# Patient Record
Sex: FEMALE | Race: BLACK OR AFRICAN AMERICAN | NOT HISPANIC OR LATINO | Employment: FULL TIME | URBAN - METROPOLITAN AREA
[De-identification: names, ages, dates, MRNs, and addresses within clinical notes are randomized per-mention and may not be internally consistent; named-entity substitution may affect disease eponyms.]

---

## 2017-11-01 ENCOUNTER — OFFICE VISIT (OUTPATIENT)
Dept: FAMILY MEDICINE | Facility: CLINIC | Age: 32
End: 2017-11-01
Payer: COMMERCIAL

## 2017-11-01 VITALS
OXYGEN SATURATION: 99 % | TEMPERATURE: 98 F | SYSTOLIC BLOOD PRESSURE: 108 MMHG | HEART RATE: 78 BPM | DIASTOLIC BLOOD PRESSURE: 70 MMHG | BODY MASS INDEX: 31.07 KG/M2 | HEIGHT: 66 IN | WEIGHT: 193.31 LBS

## 2017-11-01 DIAGNOSIS — F39 MOOD DISORDER: ICD-10-CM

## 2017-11-01 DIAGNOSIS — G43.001 MIGRAINE WITHOUT AURA AND WITH STATUS MIGRAINOSUS, NOT INTRACTABLE: ICD-10-CM

## 2017-11-01 DIAGNOSIS — L05.91 PILONIDAL CYST: ICD-10-CM

## 2017-11-01 DIAGNOSIS — Z72.0 TOBACCO ABUSE: Primary | ICD-10-CM

## 2017-11-01 PROCEDURE — 99999 PR PBB SHADOW E&M-EST. PATIENT-LVL III: CPT | Mod: PBBFAC,,, | Performed by: FAMILY MEDICINE

## 2017-11-01 PROCEDURE — 99214 OFFICE O/P EST MOD 30 MIN: CPT | Mod: S$GLB,,, | Performed by: FAMILY MEDICINE

## 2017-11-01 RX ORDER — BUPROPION HYDROCHLORIDE 150 MG/1
150 TABLET, EXTENDED RELEASE ORAL 2 TIMES DAILY
Qty: 60 TABLET | Refills: 5 | Status: SHIPPED | OUTPATIENT
Start: 2017-11-01 | End: 2020-08-20

## 2017-11-01 RX ORDER — SULFAMETHOXAZOLE AND TRIMETHOPRIM 800; 160 MG/1; MG/1
1 TABLET ORAL 2 TIMES DAILY
Qty: 14 TABLET | Refills: 0 | Status: SHIPPED | OUTPATIENT
Start: 2017-11-01 | End: 2017-11-08

## 2017-11-01 NOTE — PROGRESS NOTES
"Subjective:       Patient ID: Caty Zapata is a 32 y.o. female.    Chief Complaint: Establish Care and Headache (daily headaches around eyes)    Patient presents with recurrent headaches.  She has headache pain behind her eyes. She figured it was her eyes so she had an eye exam and subseequenlty got glasses but the headaches have not resolved. She has them daily. She hasassociated nausea, but no vomiting. She has some blurred vision. She denies auras. She states the headaches stop with medication (ibuprofen and Excedrin).  She has had them for several months now. She sleeps about 6 hours at night. She has a stressful job and a stressful family so this can be a contributing factor. No known family history of aneurysms.     Se also mentions in closing that she has a recurrent infection in the crease of her buttocks. She states it is painful and currently draining pus.       Review of Systems   Constitutional: Positive for activity change. Negative for appetite change, chills, fatigue and fever.   Neurological: Positive for headaches. Negative for dizziness, tremors, seizures, syncope, facial asymmetry, weakness and light-headedness.       Objective:       Vitals:    11/01/17 1114   BP: 108/70   Pulse: 78   Temp: 98.2 °F (36.8 °C)   TempSrc: Oral   SpO2: 99%   Weight: 87.7 kg (193 lb 5.5 oz)   Height: 5' 6" (1.676 m)       Physical Exam   Constitutional: She is oriented to person, place, and time. She appears well-developed and well-nourished. No distress.   HENT:   Head: Normocephalic and atraumatic.   Eyes: Conjunctivae are normal.   Neck: Normal range of motion. Neck supple. Carotid bruit is not present.   Cardiovascular: Normal rate, regular rhythm and normal heart sounds.  Exam reveals no gallop and no friction rub.    No murmur heard.  Pulmonary/Chest: Effort normal and breath sounds normal. No respiratory distress. She has no wheezes. She has no rales.   Musculoskeletal: She exhibits no edema.   Neurological: She " is alert and oriented to person, place, and time.   Skin: She is not diaphoretic.            Assessment:       1. Tobacco abuse    2. Mood disorder    3. Migraine without aura and with status migrainosus, not intractable    4. Pilonidal cyst        Plan:       Caty was seen today for establish care and headache.    Diagnoses and all orders for this visit:    Tobacco abuse  -     buPROPion (WELLBUTRIN SR) 150 MG TBSR 12 hr tablet; Take 1 tablet (150 mg total) by mouth 2 (two) times daily.    Mood disorder  -     buPROPion (WELLBUTRIN SR) 150 MG TBSR 12 hr tablet; Take 1 tablet (150 mg total) by mouth 2 (two) times daily.  Will give her wellbutrin to help with he rmood andget her to quit smoking as dianne can be the cause of her headaches as well.     Migraine without aura and with status migrainosus, not intractable  -     CT Head Without Contrast; Future  Will order CT due to blurred vision.  Pilonidal cyst  -     sulfamethoxazole-trimethoprim 800-160mg (BACTRIM DS) 800-160 mg Tab; Take 1 tablet by mouth 2 (two) times daily.  -     Ambulatory referral to Colorectal Surgery

## 2017-11-03 ENCOUNTER — TELEPHONE (OUTPATIENT)
Dept: FAMILY MEDICINE | Facility: CLINIC | Age: 32
End: 2017-11-03

## 2017-11-03 NOTE — LETTER
November 3, 2017    Caty Zapata  [address]  [place] [address]             [facility]  [address] [facility]  [place] [address]  Phone: [phone]  Fax: [phone] Dear Ms. Zapata:    I have been unable to reach you by phone for your appointment Colon & Rectal .  Please call me at the clinic [phone] to book your appointment.      Sincerely,        Emma Rodriguez MA

## 2017-12-08 ENCOUNTER — OFFICE VISIT (OUTPATIENT)
Dept: FAMILY MEDICINE | Facility: CLINIC | Age: 32
End: 2017-12-08
Payer: COMMERCIAL

## 2017-12-08 VITALS
DIASTOLIC BLOOD PRESSURE: 70 MMHG | TEMPERATURE: 98 F | HEART RATE: 60 BPM | SYSTOLIC BLOOD PRESSURE: 110 MMHG | WEIGHT: 195.31 LBS | OXYGEN SATURATION: 98 % | BODY MASS INDEX: 31.39 KG/M2 | HEIGHT: 66 IN

## 2017-12-08 DIAGNOSIS — M77.8 TENDINITIS OF THUMB: ICD-10-CM

## 2017-12-08 DIAGNOSIS — M17.10 ARTHRITIS OF KNEE: Primary | ICD-10-CM

## 2017-12-08 PROCEDURE — 99213 OFFICE O/P EST LOW 20 MIN: CPT | Mod: S$GLB,,, | Performed by: FAMILY MEDICINE

## 2017-12-08 PROCEDURE — 99999 PR PBB SHADOW E&M-EST. PATIENT-LVL III: CPT | Mod: PBBFAC,,, | Performed by: FAMILY MEDICINE

## 2017-12-08 RX ORDER — NAPROXEN 500 MG/1
500 TABLET ORAL 2 TIMES DAILY WITH MEALS
Qty: 60 TABLET | Refills: 5 | Status: SHIPPED | OUTPATIENT
Start: 2017-12-08 | End: 2020-08-20 | Stop reason: ALTCHOICE
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
## 2019-04-12 ENCOUNTER — OFFICE VISIT (OUTPATIENT)
Dept: FAMILY MEDICINE | Facility: CLINIC | Age: 34
End: 2019-04-12
Payer: COMMERCIAL

## 2019-04-12 VITALS
BODY MASS INDEX: 31.57 KG/M2 | TEMPERATURE: 99 F | DIASTOLIC BLOOD PRESSURE: 72 MMHG | HEIGHT: 66 IN | OXYGEN SATURATION: 97 % | HEART RATE: 70 BPM | RESPIRATION RATE: 18 BRPM | WEIGHT: 196.44 LBS | SYSTOLIC BLOOD PRESSURE: 110 MMHG

## 2019-04-12 DIAGNOSIS — Z13.220 LIPID SCREENING: ICD-10-CM

## 2019-04-12 DIAGNOSIS — B30.9 VIRAL CONJUNCTIVITIS OF RIGHT EYE: ICD-10-CM

## 2019-04-12 DIAGNOSIS — M79.2 NEUROPATHIC PAIN OF FINGER OF LEFT HAND: ICD-10-CM

## 2019-04-12 DIAGNOSIS — J06.9 UPPER RESPIRATORY INFECTION, VIRAL: Primary | ICD-10-CM

## 2019-04-12 PROCEDURE — 96372 THER/PROPH/DIAG INJ SC/IM: CPT | Mod: S$GLB,,, | Performed by: INTERNAL MEDICINE

## 2019-04-12 PROCEDURE — 99214 PR OFFICE/OUTPT VISIT, EST, LEVL IV, 30-39 MIN: ICD-10-PCS | Mod: 25,S$GLB,, | Performed by: INTERNAL MEDICINE

## 2019-04-12 PROCEDURE — 99214 OFFICE O/P EST MOD 30 MIN: CPT | Mod: 25,S$GLB,, | Performed by: INTERNAL MEDICINE

## 2019-04-12 PROCEDURE — 96372 PR INJECTION,THERAP/PROPH/DIAG2ST, IM OR SUBCUT: ICD-10-PCS | Mod: S$GLB,,, | Performed by: INTERNAL MEDICINE

## 2019-04-12 PROCEDURE — 99999 PR PBB SHADOW E&M-EST. PATIENT-LVL III: ICD-10-PCS | Mod: PBBFAC,,, | Performed by: INTERNAL MEDICINE

## 2019-04-12 PROCEDURE — 99999 PR PBB SHADOW E&M-EST. PATIENT-LVL III: CPT | Mod: PBBFAC,,, | Performed by: INTERNAL MEDICINE

## 2019-04-12 RX ORDER — TRIAMCINOLONE ACETONIDE 40 MG/ML
40 INJECTION, SUSPENSION INTRA-ARTICULAR; INTRAMUSCULAR ONCE
Status: COMPLETED | OUTPATIENT
Start: 2019-04-12 | End: 2019-04-12

## 2019-04-12 RX ORDER — PROMETHAZINE HYDROCHLORIDE AND DEXTROMETHORPHAN HYDROBROMIDE 6.25; 15 MG/5ML; MG/5ML
5 SYRUP ORAL EVERY 12 HOURS PRN
Qty: 180 ML | Refills: 0 | Status: SHIPPED | OUTPATIENT
Start: 2019-04-12 | End: 2019-04-22

## 2019-04-12 RX ORDER — AZELASTINE HYDROCHLORIDE 0.5 MG/ML
1 SOLUTION/ DROPS OPHTHALMIC 2 TIMES DAILY
Qty: 4 ML | Refills: 0 | Status: SHIPPED | OUTPATIENT
Start: 2019-04-12 | End: 2020-08-20

## 2019-04-12 RX ORDER — DESLORATADINE 5 MG/1
5 TABLET ORAL DAILY
Qty: 30 TABLET | Refills: 0 | Status: SHIPPED | OUTPATIENT
Start: 2019-04-12 | End: 2020-08-20 | Stop reason: ALTCHOICE

## 2019-04-12 RX ORDER — METHYLPREDNISOLONE 4 MG/1
TABLET ORAL
Qty: 1 PACKAGE | Refills: 0 | Status: SHIPPED | OUTPATIENT
Start: 2019-04-13 | End: 2020-08-20 | Stop reason: ALTCHOICE

## 2019-04-12 RX ADMIN — TRIAMCINOLONE ACETONIDE 40 MG: 40 INJECTION, SUSPENSION INTRA-ARTICULAR; INTRAMUSCULAR at 04:04

## 2019-04-12 NOTE — PROGRESS NOTES
SUBJECTIVE     Chief Complaint   Patient presents with    Sinus Problem    index(left) finger pain       HPI  Caty Zapata is a 34 y.o. female with multiple medical diagnoses as listed in the medical history and problem list that presents for evaluation of URI x 1 week. Pt reports runny nose, R eye pain with swelling, and headache. +subjective fever, chills, and night sweats. Pt has been taking Benadryl and Tylenol Cold and Sinus with some improvement of symptoms. Denies any recent travel. +sick contacts(niece, mother, and brother with URI).    L index finger- x 6-7 months. Pt reports it feels like needles sticking her at the tip of the finger at a 10/10 and intermittent in nature. Pain is worse while at work with typing/writing. Pt has not been taking any meds for her symptoms.    PAST MEDICAL HISTORY:  Past Medical History:   Diagnosis Date    Abnormal Pap smear     Cervical cancer     Vaginal delivery     x1       PAST SURGICAL HISTORY:  Past Surgical History:   Procedure Laterality Date    KNEE SURGERY  2015    patellar surgery. dr. vazquez    [place]    ORIF, FRACTURE, PATELLA Left 12/4/2013    Performed by Abhishek Vazquez MD at [facility] OR       SOCIAL HISTORY:  Social History     Socioeconomic History    Marital status: Single     Spouse name: Not on file    Number of children: Not on file    Years of education: Not on file    Highest education level: Not on file   Occupational History     Comment: works for [employer].    Social Needs    Financial resource strain: Not on file    Food insecurity:     Worry: Not on file     Inability: Not on file    Transportation needs:     Medical: Not on file     Non-medical: Not on file   Tobacco Use    Smoking status: Current Every Day Smoker     Packs/day: 0.50     Years: 10.00     Pack years: 5.00   Substance and Sexual Activity    Alcohol use: Yes     Comment: occassional    Drug use: No    Sexual activity: Yes      Partners: Male   Lifestyle    Physical activity:     Days per week: Not on file     Minutes per session: Not on file    Stress: Not on file   Relationships    Social connections:     Talks on phone: Not on file     Gets together: Not on file     Attends Restoration service: Not on file     Active member of club or organization: Not on file     Attends meetings of clubs or organizations: Not on file     Relationship status: Not on file   Other Topics Concern    Not on file   Social History Narrative    Not on file       FAMILY HISTORY:  Family History   Problem Relation Age of Onset    Diabetes Mother     Hypertension Mother     Bell's palsy Mother     Stroke Paternal Grandfather     Cervical cancer Sister     Breast cancer Maternal Grandmother     Breast cancer Maternal Aunt     Colon cancer Neg Hx     Ovarian cancer Neg Hx        ALLERGIES AND MEDICATIONS: updated and reviewed.  Review of patient's allergies indicates:  No Known Allergies  Current Outpatient Medications   Medication Sig Dispense Refill    azelastine (OPTIVAR) 0.05 % ophthalmic solution Place 1 drop into the right eye 2 (two) times daily. 4 mL 0    buPROPion (WELLBUTRIN SR) 150 MG TBSR 12 hr tablet Take 1 tablet (150 mg total) by mouth 2 (two) times daily. 60 tablet 5    desloratadine (CLARINEX) 5 mg tablet Take 1 tablet (5 mg total) by mouth once daily. 30 tablet 0    [START ON 4/13/2019] methylPREDNISolone (MEDROL DOSEPACK) 4 mg tablet use as directed 1 Package 0    naproxen (NAPROSYN) 500 MG tablet Take 1 tablet (500 mg total) by mouth 2 (two) times daily with meals. 60 tablet 5    promethazine-dextromethorphan (PROMETHAZINE-DM) 6.25-15 mg/5 mL Syrp Take 5 mLs by mouth every 12 (twelve) hours as needed. 180 mL 0     Current Facility-Administered Medications   Medication Dose Route Frequency Provider Last Rate Last Dose    triamcinolone acetonide injection 40 mg  40 mg Intramuscular Once Lissette Cordero MD      "      ROS  Review of Systems   Constitutional: Positive for chills and fever.   HENT: Negative for hearing loss and sore throat.    Eyes: Positive for pain (right). Negative for visual disturbance.   Respiratory: Positive for chest tightness. Negative for cough and shortness of breath.    Cardiovascular: Negative for chest pain, palpitations and leg swelling.   Gastrointestinal: Negative for abdominal pain, constipation, diarrhea, nausea and vomiting.   Genitourinary: Negative for dysuria, frequency and urgency.   Musculoskeletal: Negative for arthralgias, joint swelling and myalgias.   Skin: Negative for rash and wound.   Neurological: Positive for numbness (L index finger) and headaches.   Psychiatric/Behavioral: Negative for agitation and confusion. The patient is not nervous/anxious.          OBJECTIVE     Physical Exam  Vitals:    04/12/19 1600   BP: 110/72   Pulse: 70   Resp: 18   Temp: 98.5 °F (36.9 °C)    Body mass index is 31.7 kg/m².  Weight: 89.1 kg (196 lb 6.9 oz)   Height: 5' 6" (167.6 cm)     Physical Exam   Constitutional: She is oriented to person, place, and time. She appears well-developed and well-nourished. No distress.   HENT:   Head: Normocephalic and atraumatic.   Right Ear: Hearing, tympanic membrane and external ear normal.   Left Ear: Hearing, tympanic membrane and external ear normal.   Nose: No rhinorrhea. Right sinus exhibits no maxillary sinus tenderness and no frontal sinus tenderness. Left sinus exhibits no maxillary sinus tenderness and no frontal sinus tenderness.   Mouth/Throat: No uvula swelling. Posterior oropharyngeal erythema present. No posterior oropharyngeal edema.   Eyes: Conjunctivae and EOM are normal. Right eye exhibits no discharge. Left eye exhibits no discharge. No scleral icterus.   Neck: Normal range of motion. Neck supple. No JVD present. No tracheal deviation present.   Cardiovascular: Normal rate, regular rhythm and intact distal pulses. Exam reveals no gallop " and no friction rub.   No murmur heard.  Pulmonary/Chest: Effort normal and breath sounds normal. No respiratory distress. She has no wheezes.   Abdominal: Soft. Bowel sounds are normal. She exhibits no distension and no mass. There is no tenderness. There is no rebound and no guarding.   Musculoskeletal: Normal range of motion. She exhibits no edema, tenderness or deformity.   Neurological: She is alert and oriented to person, place, and time. She exhibits normal muscle tone. Coordination normal.   Skin: Skin is warm and dry. No rash noted. No erythema.   Psychiatric: She has a normal mood and affect. Her behavior is normal. Judgment and thought content normal.         Health Maintenance       Date Due Completion Date    Lipid Panel 1985 ---    Pneumococcal Vaccine (Medium Risk) (1 of 1 - PPSV23) 03/07/2004 ---    Pap Smear 12/12/2019 12/12/2018    TETANUS VACCINE 12/01/2023 12/1/2013            ASSESSMENT     34 y.o. female with     1. Upper respiratory infection, viral    2. Viral conjunctivitis of right eye    3. Neuropathic pain of finger of left hand    4. Lipid screening        PLAN:     1. Upper respiratory infection, viral  - Continue symptomatic treatment with rest, increase fluid intake, tylenol or ibuprofen PRN fever(temp >/= 100.4) or body aches. Okay to take OTC antihistamines, i.e. Bendaryl, Claritin, Allegra, etc. as needed.  - Okay to gargle with warm, salt water or use throat lozenges as needed  - triamcinolone acetonide injection 40 mg  - desloratadine (CLARINEX) 5 mg tablet; Take 1 tablet (5 mg total) by mouth once daily.  Dispense: 30 tablet; Refill: 0  - methylPREDNISolone (MEDROL DOSEPACK) 4 mg tablet; use as directed  Dispense: 1 Package; Refill: 0  - promethazine-dextromethorphan (PROMETHAZINE-DM) 6.25-15 mg/5 mL Syrp; Take 5 mLs by mouth every 12 (twelve) hours as needed.  Dispense: 180 mL; Refill: 0    2. Viral conjunctivitis of right eye  - azelastine (OPTIVAR) 0.05 % ophthalmic  solution; Place 1 drop into the right eye 2 (two) times daily.  Dispense: 4 mL; Refill: 0    3. Neuropathic pain of finger of left hand  - Unknown cause, but start workup as below  - Take NSAIDs prn   - Comprehensive metabolic panel; Future  - CBC auto differential; Future  - TSH; Future  - Hemoglobin A1c; Future  - Sedimentation rate; Future  - C-reactive protein; Future  - Hepatitis panel, acute; Future  - RPR; Future  - Vitamin B12; Future  - Folate; Future    4. Lipid screening  - Lipid panel; Future        RTC in 1-2 weeks as needed for any acute worsening of current condition or failure to improve       Lissette Cordero MD  04/12/2019 4:04 PM        No follow-ups on file.

## 2019-04-12 NOTE — LETTER
April 12, 2019      [facility]  [address]  [address]  [place] [address]  Phone: [phone]  Fax: [phone]       Patient: Caty Zapata   YOB: 1985  Date of Visit: 04/12/2019    To Whom It May Concern:    Prema Zapata  was at [facility] on 04/12/2019. She may return to work/school on 4/15/19 with no restrictions. If you have any questions or concerns, or if I can be of further assistance, please do not hesitate to contact me.    Sincerely,    Lissette Cordero MD

## 2019-04-15 ENCOUNTER — TELEPHONE (OUTPATIENT)
Dept: FAMILY MEDICINE | Facility: CLINIC | Age: 34
End: 2019-04-15

## 2019-04-15 NOTE — TELEPHONE ENCOUNTER
----- Message from Tani Lane sent at 4/12/2019  4:49 PM CDT -----  Contact: [facility]  Name of Who is Calling:Pharmacist        What is the request in detail: Pt's desloratadine (CLARINEX) 5 mg tablet is too expensive; requesting alternate. Please advise.      What Number to Call Back if not in [facility]:

## 2019-04-15 NOTE — TELEPHONE ENCOUNTER
Okay to take any OTC antihistamines, i.e. Bendaryl, Claritin, Allegra, Xyzal, etc. as needed. Any of these are good alternatives.

## 2020-08-20 ENCOUNTER — OFFICE VISIT (OUTPATIENT)
Dept: FAMILY MEDICINE | Facility: CLINIC | Age: 35
End: 2020-08-20
Payer: COMMERCIAL

## 2020-08-20 VITALS
TEMPERATURE: 98 F | WEIGHT: 205 LBS | OXYGEN SATURATION: 99 % | BODY MASS INDEX: 32.95 KG/M2 | HEART RATE: 81 BPM | HEIGHT: 66 IN | SYSTOLIC BLOOD PRESSURE: 100 MMHG | DIASTOLIC BLOOD PRESSURE: 80 MMHG

## 2020-08-20 DIAGNOSIS — L72.0 EPIDERMAL INCLUSION CYST: Primary | ICD-10-CM

## 2020-08-20 PROCEDURE — 99999 PR PBB SHADOW E&M-EST. PATIENT-LVL III: ICD-10-PCS | Mod: PBBFAC,,, | Performed by: FAMILY MEDICINE

## 2020-08-20 PROCEDURE — 99999 PR PBB SHADOW E&M-EST. PATIENT-LVL III: CPT | Mod: PBBFAC,,, | Performed by: FAMILY MEDICINE

## 2020-08-20 PROCEDURE — 99214 PR OFFICE/OUTPT VISIT, EST, LEVL IV, 30-39 MIN: ICD-10-PCS | Mod: 25,S$GLB,, | Performed by: FAMILY MEDICINE

## 2020-08-20 PROCEDURE — 99214 OFFICE O/P EST MOD 30 MIN: CPT | Mod: 25,S$GLB,, | Performed by: FAMILY MEDICINE

## 2020-08-20 RX ORDER — CLINDAMYCIN HYDROCHLORIDE 300 MG/1
CAPSULE ORAL
COMMUNITY
Start: 2020-08-19 | End: 2021-02-25

## 2020-08-20 NOTE — PROGRESS NOTES
Chief Complaint   Patient presents with    Recurrent Skin Infections       SUBJECTIVE:  Caty Zapata is a 35 y.o. female here for new problem of abscees/cyst on buttocks, new to me, treated outside with clindamycin and improved cellulitis but a lot of pain and needs to drain she states, no fever or chills noted.  Currently has co-morbidities including per problem list.      Past Medical History:   Diagnosis Date    Abnormal Pap smear     Cervical cancer     Vaginal delivery     x1     Past Surgical History:   Procedure Laterality Date    KNEE SURGERY  2015    patellar surgery. dr. george HARTLEY   2002    of cervix     Social History     Socioeconomic History    Marital status: Single     Spouse name: Not on file    Number of children: Not on file    Years of education: Not on file    Highest education level: Not on file   Occupational History     Comment: works for [employer].    Social Needs    Financial resource strain: Not on file    Food insecurity     Worry: Not on file     Inability: Not on file    Transportation needs     Medical: Not on file     Non-medical: Not on file   Tobacco Use    Smoking status: Current Every Day Smoker     Packs/day: 0.50     Years: 10.00     Pack years: 5.00   Substance and Sexual Activity    Alcohol use: Yes     Comment: occassional    Drug use: No    Sexual activity: Yes     Partners: Male   Lifestyle    Physical activity     Days per week: Not on file     Minutes per session: Not on file    Stress: Not at all   Relationships    Social connections     Talks on phone: Not on file     Gets together: Not on file     Attends Druze service: Not on file     Active member of club or organization: Not on file     Attends meetings of clubs or organizations: Not on file     Relationship status: Not on file   Other Topics Concern    Not on file   Social History Narrative    Not on file     Family History   Problem Relation Age of Onset     "Diabetes Mother     Hypertension Mother     Bell's palsy Mother     Stroke Paternal Grandfather     Cervical cancer Sister     Breast cancer Maternal Grandmother     Breast cancer Maternal Aunt     Colon cancer Neg Hx     Ovarian cancer Neg Hx      Current Outpatient Medications on File Prior to Visit   Medication Sig Dispense Refill    clindamycin (CLEOCIN) 300 MG capsule TAKE ONE CAPSULE BY MOUTH EVERY 8 HOURS FOR 10 DAYS      azelastine (OPTIVAR) 0.05 % ophthalmic solution Place 1 drop into the right eye 2 (two) times daily. (Patient not taking: Reported on 8/20/2020) 4 mL 0    buPROPion (WELLBUTRIN SR) 150 MG TBSR 12 hr tablet Take 1 tablet (150 mg total) by mouth 2 (two) times daily. (Patient not taking: Reported on 8/20/2020) 60 tablet 5    desloratadine (CLARINEX) 5 mg tablet Take 1 tablet (5 mg total) by mouth once daily. (Patient not taking: Reported on 8/20/2020) 30 tablet 0    methylPREDNISolone (MEDROL DOSEPACK) 4 mg tablet use as directed (Patient not taking: Reported on 8/20/2020) 1 Package 0    naproxen (NAPROSYN) 500 MG tablet Take 1 tablet (500 mg total) by mouth 2 (two) times daily with meals. (Patient not taking: Reported on 8/20/2020) 60 tablet 5     No current facility-administered medications on file prior to visit.      Review of patient's allergies indicates:  No Known Allergies      Review of Systems   Constitutional: Negative.    HENT: Negative.    Eyes: Negative.    Respiratory: Negative.    Cardiovascular: Negative.    Gastrointestinal: Negative.    Genitourinary: Negative.    Musculoskeletal: Negative.    Skin: Negative.         Skin infection   Neurological: Negative.    Endo/Heme/Allergies: Negative.    Psychiatric/Behavioral: Negative.        OBJECTIVE:  /80   Pulse 81   Temp 98.1 °F (36.7 °C) (Oral)   Ht 5' 6" (1.676 m)   Wt 93 kg (205 lb 0.4 oz)   LMP 07/31/2020   SpO2 99%   BMI 33.09 kg/m²     Wt Readings from Last 3 Encounters:   08/20/20 93 kg (205 lb " 0.4 oz)   04/12/19 89.1 kg (196 lb 6.9 oz)   04/12/19 88.9 kg (196 lb)     BP Readings from Last 3 Encounters:   08/20/20 100/80   04/12/19 110/72   04/12/19 124/82       She appears well, in no apparent distress.  Alert and oriented times three, pleasant and cooperative. Vital signs are as documented in vital signs section.  In pain  Buttocks with large cyst/abscess in the upper gluteal cleft  Severe pain and no cellulitis    Review of old Records:  Reviewed [facility]    Review of old labs:      Review of old imaging:      ASSESSMENT:  Problem List Items Addressed This Visit     None      Visit Diagnoses     Epidermal inclusion cyst    -  Primary    Relevant Orders    Ambulatory referral/consult to General Surgery          ICD-10-CM ICD-9-CM   1. Epidermal inclusion cyst  L72.0 706.2       Infected  Continue clindamycin  May need full excision to fully resolve  Get to the surgery team  PLAN:  Problem List Items Addressed This Visit     None      Visit Diagnoses     Epidermal inclusion cyst    -  Primary    Relevant Orders    Ambulatory referral/consult to General Surgery          Medication List with Changes/Refills   Current Medications    AZELASTINE (OPTIVAR) 0.05 % OPHTHALMIC SOLUTION    Place 1 drop into the right eye 2 (two) times daily.    BUPROPION (WELLBUTRIN SR) 150 MG TBSR 12 HR TABLET    Take 1 tablet (150 mg total) by mouth 2 (two) times daily.    CLINDAMYCIN (CLEOCIN) 300 MG CAPSULE    TAKE ONE CAPSULE BY MOUTH EVERY 8 HOURS FOR 10 DAYS    DESLORATADINE (CLARINEX) 5 MG TABLET    Take 1 tablet (5 mg total) by mouth once daily.    METHYLPREDNISOLONE (MEDROL DOSEPACK) 4 MG TABLET    use as directed    NAPROXEN (NAPROSYN) 500 MG TABLET    Take 1 tablet (500 mg total) by mouth 2 (two) times daily with meals.         No follow-ups on file.

## 2020-08-21 NOTE — PROCEDURES
"Incision & Drainage    Date/Time: 8/20/2020 11:15 AM  Performed by: Karsten Harris MD  Authorized by: Karsten Harris MD     Time out: Immediately prior to procedure a "time out" was called to verify the correct patient, procedure, equipment, support staff and site/side marked as required.    Consent Done?:  Yes (Written)    Type:  Abscess  Body area:  Anogenital  Location details:  Gluteal cleft  Scalpel size:  11  Incision type:  Single straight  Complexity:  Simple  Drainage:  Pus  Drainage amount:  Copious  Wound treatment:  Wound left open  Patient tolerance:  Patient tolerated the procedure well with no immediate complications    15 cc blood loss      "

## 2020-11-25 ENCOUNTER — OFFICE VISIT (OUTPATIENT)
Dept: FAMILY MEDICINE | Facility: CLINIC | Age: 35
End: 2020-11-25
Payer: COMMERCIAL

## 2020-11-25 VITALS
BODY MASS INDEX: 32.71 KG/M2 | WEIGHT: 203.5 LBS | TEMPERATURE: 98 F | OXYGEN SATURATION: 99 % | SYSTOLIC BLOOD PRESSURE: 110 MMHG | HEIGHT: 66 IN | RESPIRATION RATE: 18 BRPM | HEART RATE: 74 BPM | DIASTOLIC BLOOD PRESSURE: 74 MMHG

## 2020-11-25 DIAGNOSIS — Z72.0 TOBACCO USE: ICD-10-CM

## 2020-11-25 DIAGNOSIS — F48.9 TENSION: ICD-10-CM

## 2020-11-25 DIAGNOSIS — K59.00 CONSTIPATION, UNSPECIFIED CONSTIPATION TYPE: ICD-10-CM

## 2020-11-25 DIAGNOSIS — R10.13 EPIGASTRIC ABDOMINAL PAIN: Primary | ICD-10-CM

## 2020-11-25 PROCEDURE — 99214 PR OFFICE/OUTPT VISIT, EST, LEVL IV, 30-39 MIN: ICD-10-PCS | Mod: S$GLB,,, | Performed by: PHYSICIAN ASSISTANT

## 2020-11-25 PROCEDURE — 99999 PR PBB SHADOW E&M-EST. PATIENT-LVL IV: CPT | Mod: PBBFAC,,, | Performed by: PHYSICIAN ASSISTANT

## 2020-11-25 PROCEDURE — 99214 OFFICE O/P EST MOD 30 MIN: CPT | Mod: S$GLB,,, | Performed by: PHYSICIAN ASSISTANT

## 2020-11-25 PROCEDURE — 99999 PR PBB SHADOW E&M-EST. PATIENT-LVL IV: ICD-10-PCS | Mod: PBBFAC,,, | Performed by: PHYSICIAN ASSISTANT

## 2020-11-25 RX ORDER — IBUPROFEN 200 MG
1 TABLET ORAL DAILY
Qty: 42 PATCH | Refills: 0 | Status: SHIPPED | OUTPATIENT
Start: 2020-11-25 | End: 2021-01-06

## 2020-11-25 RX ORDER — TIZANIDINE 4 MG/1
4 TABLET ORAL NIGHTLY PRN
Qty: 30 TABLET | Refills: 0 | Status: SHIPPED | OUTPATIENT
Start: 2020-11-25 | End: 2020-12-05

## 2020-11-25 NOTE — PROGRESS NOTES
Subjective:       Patient ID: Caty Zapata is a 35 y.o. female with multiple medical diagnoses as listed in the medical history and problem list that presents for Abdominal Pain (x 3 days) and Headache  .    Chief Complaint: Abdominal Pain (x 3 days) and Headache      Abdominal Pain  This is a new problem. The current episode started in the past 7 days (3 days ). The problem occurs intermittently. The problem has been gradually improving. The pain is located in the epigastric region. Quality: sharp  The abdominal pain does not radiate. Associated symptoms include constipation, diarrhea (monday twice ), flatus, headaches and nausea. Pertinent negatives include no anorexia, belching or vomiting. Associated symptoms comments: Last BM Monday diarrhea that day  She states she only usually has a BM 3-4 x a month   Pt states sometimes long pieces and sometimes alex  No straining . Relieved by: lying. She has tried nothing for the symptoms.   Headache   This is a new problem. The current episode started more than 1 month ago (this year; has glasses but does not wear them low prescription but when she wears it it hleps a little ). The problem occurs constantly. The problem has been waxing and waning. The pain is located in the bilateral and retro-orbital region. The pain radiates to the left arm, left shoulder and left neck (in general more stressed ). The pain is at a severity of 10/10 (with med down to a zero ). Associated symptoms include abdominal pain, blurred vision and nausea. Pertinent negatives include no anorexia, insomnia (with melatonin ), phonophobia, photophobia or vomiting. Treatments tried: pain-aid--takes every 4 hours over the past few weeks  The treatment provided significant relief.        Review of Systems   Eyes: Positive for blurred vision. Negative for photophobia.   Gastrointestinal: Positive for abdominal pain, constipation, diarrhea (monday twice ), flatus and nausea. Negative for anorexia and  vomiting.   Neurological: Positive for headaches.   Psychiatric/Behavioral: The patient does not have insomnia (with melatonin ).          PAST MEDICAL HISTORY:  Past Medical History:   Diagnosis Date    Abnormal Pap smear     Cervical cancer     Vaginal delivery     x1       SOCIAL HISTORY:  Social History     Socioeconomic History    Marital status: Single     Spouse name: Not on file    Number of children: Not on file    Years of education: Not on file    Highest education level: Not on file   Occupational History     Comment: works for [employer].    Social Needs    Financial resource strain: Not on file    Food insecurity     Worry: Not on file     Inability: Not on file    Transportation needs     Medical: Not on file     Non-medical: Not on file   Tobacco Use    Smoking status: Current Every Day Smoker     Packs/day: 0.50     Years: 10.00     Pack years: 5.00   Substance and Sexual Activity    Alcohol use: Yes     Comment: occassional    Drug use: No    Sexual activity: Yes     Partners: Male   Lifestyle    Physical activity     Days per week: Not on file     Minutes per session: Not on file    Stress: Not at all   Relationships    Social connections     Talks on phone: Not on file     Gets together: Not on file     Attends Yarsanism service: Not on file     Active member of club or organization: Not on file     Attends meetings of clubs or organizations: Not on file     Relationship status: Not on file   Other Topics Concern    Not on file   Social History Narrative    Not on file       ALLERGIES AND MEDICATIONS: updated and reviewed.  Review of patient's allergies indicates:  No Known Allergies  Current Outpatient Medications   Medication Sig Dispense Refill    clindamycin (CLEOCIN) 300 MG capsule TAKE ONE CAPSULE BY MOUTH EVERY 8 HOURS FOR 10 DAYS      nicotine (NICODERM CQ) 21 mg/24 hr Place 1 patch onto the skin once daily. 42 patch 0    tiZANidine (ZANAFLEX) 4 MG  "tablet Take 1 tablet (4 mg total) by mouth nightly as needed. 30 tablet 0     No current facility-administered medications for this visit.          Objective:   /74 (BP Location: Left arm, Patient Position: Sitting, BP Method: Large (Manual))   Pulse 74   Temp 98.2 °F (36.8 °C) (Oral)   Resp 18   Ht 5' 6" (1.676 m)   Wt 92.3 kg (203 lb 7.8 oz)   SpO2 99%   BMI 32.84 kg/m²      Physical Exam  HENT:      Head: Normocephalic and atraumatic.   Abdominal:      General: Bowel sounds are normal.      Tenderness: There is abdominal tenderness in the epigastric area.      Hernia: No hernia is present.   Neurological:      Mental Status: She is alert and oriented to person, place, and time.             Assessment:       1. Epigastric abdominal pain    2. Tension    3. Constipation, unspecified constipation type    4. Tobacco use        Plan:       Epigastric abdominal pain  -     X-Ray Abdomen Flat And Erect; Future; Expected date: 11/25/2020  -     Comprehensive Metabolic Panel; Future; Expected date: 11/25/2020  -     Lipase; Future; Expected date: 11/25/2020  -     Amylase; Future; Expected date: 11/25/2020  Fiber pills  Water 3-4 bottles   miralax powder      Tension  -     tiZANidine (ZANAFLEX) 4 MG tablet; Take 1 tablet (4 mg total) by mouth nightly as needed.  Dispense: 30 tablet; Refill: 0  Moist heat  Exercise to help BMs and stress  Wear her glasses as well since it does somewhat help. Follow up since it has been 2 years since her eye exam.     Constipation, unspecified constipation type  -     X-Ray Abdomen Flat And Erect; Future; Expected date: 11/25/2020  -will contact with results     Tobacco use  -     nicotine (NICODERM CQ) 21 mg/24 hr; Place 1 patch onto the skin once daily.  Dispense: 42 patch; Refill: 0  Follow up in 5 weeks for this.           No follow-ups on file.  "

## 2021-02-25 ENCOUNTER — OFFICE VISIT (OUTPATIENT)
Dept: FAMILY MEDICINE | Facility: CLINIC | Age: 36
End: 2021-02-25
Payer: COMMERCIAL

## 2021-02-25 VITALS
RESPIRATION RATE: 16 BRPM | HEIGHT: 66 IN | BODY MASS INDEX: 32.59 KG/M2 | DIASTOLIC BLOOD PRESSURE: 80 MMHG | TEMPERATURE: 98 F | HEART RATE: 68 BPM | SYSTOLIC BLOOD PRESSURE: 110 MMHG | OXYGEN SATURATION: 98 % | WEIGHT: 202.81 LBS

## 2021-02-25 DIAGNOSIS — G44.40 MEDICATION OVERUSE HEADACHE: Primary | ICD-10-CM

## 2021-02-25 DIAGNOSIS — R29.3 BAD POSTURE: ICD-10-CM

## 2021-02-25 DIAGNOSIS — M25.511 ACUTE PAIN OF RIGHT SHOULDER: ICD-10-CM

## 2021-02-25 PROCEDURE — 99214 OFFICE O/P EST MOD 30 MIN: CPT | Mod: S$GLB,,, | Performed by: PHYSICIAN ASSISTANT

## 2021-02-25 PROCEDURE — 99999 PR PBB SHADOW E&M-EST. PATIENT-LVL III: CPT | Mod: PBBFAC,,, | Performed by: PHYSICIAN ASSISTANT

## 2021-02-25 PROCEDURE — 99214 PR OFFICE/OUTPT VISIT, EST, LEVL IV, 30-39 MIN: ICD-10-PCS | Mod: S$GLB,,, | Performed by: PHYSICIAN ASSISTANT

## 2021-02-25 PROCEDURE — 99999 PR PBB SHADOW E&M-EST. PATIENT-LVL III: ICD-10-PCS | Mod: PBBFAC,,, | Performed by: PHYSICIAN ASSISTANT

## 2021-02-25 RX ORDER — BACLOFEN 10 MG/1
10 TABLET ORAL NIGHTLY PRN
Qty: 30 TABLET | Refills: 0 | Status: SHIPPED | OUTPATIENT
Start: 2021-02-25 | End: 2022-03-09

## 2021-02-25 RX ORDER — AMITRIPTYLINE HYDROCHLORIDE 10 MG/1
10 TABLET, FILM COATED ORAL NIGHTLY PRN
Qty: 30 TABLET | Refills: 0 | Status: SHIPPED | OUTPATIENT
Start: 2021-02-25 | End: 2022-03-09

## 2021-02-25 RX ORDER — SUMATRIPTAN SUCCINATE 25 MG/1
TABLET ORAL
Qty: 30 TABLET | Refills: 0 | Status: SHIPPED | OUTPATIENT
Start: 2021-02-25 | End: 2022-03-09

## 2021-06-24 ENCOUNTER — OFFICE VISIT (OUTPATIENT)
Dept: FAMILY MEDICINE | Facility: CLINIC | Age: 36
End: 2021-06-24
Payer: COMMERCIAL

## 2021-06-24 VITALS
BODY MASS INDEX: 33.31 KG/M2 | DIASTOLIC BLOOD PRESSURE: 74 MMHG | OXYGEN SATURATION: 99 % | WEIGHT: 207.25 LBS | SYSTOLIC BLOOD PRESSURE: 110 MMHG | HEIGHT: 66 IN | TEMPERATURE: 98 F | RESPIRATION RATE: 18 BRPM | HEART RATE: 68 BPM

## 2021-06-24 DIAGNOSIS — M25.512 PAIN, JOINT, SHOULDER, LEFT: ICD-10-CM

## 2021-06-24 DIAGNOSIS — M54.2 ACUTE NECK PAIN: Primary | ICD-10-CM

## 2021-06-24 PROCEDURE — 99214 OFFICE O/P EST MOD 30 MIN: CPT | Mod: 25,S$GLB,, | Performed by: NURSE PRACTITIONER

## 2021-06-24 PROCEDURE — 99214 PR OFFICE/OUTPT VISIT, EST, LEVL IV, 30-39 MIN: ICD-10-PCS | Mod: 25,S$GLB,, | Performed by: NURSE PRACTITIONER

## 2021-06-24 PROCEDURE — 99999 PR PBB SHADOW E&M-EST. PATIENT-LVL IV: ICD-10-PCS | Mod: PBBFAC,,, | Performed by: NURSE PRACTITIONER

## 2021-06-24 PROCEDURE — 99999 PR PBB SHADOW E&M-EST. PATIENT-LVL IV: CPT | Mod: PBBFAC,,, | Performed by: NURSE PRACTITIONER

## 2021-06-24 PROCEDURE — 96372 THER/PROPH/DIAG INJ SC/IM: CPT | Mod: S$GLB,,, | Performed by: NURSE PRACTITIONER

## 2021-06-24 PROCEDURE — 96372 PR INJECTION,THERAP/PROPH/DIAG2ST, IM OR SUBCUT: ICD-10-PCS | Mod: S$GLB,,, | Performed by: NURSE PRACTITIONER

## 2021-06-24 RX ORDER — KETOROLAC TROMETHAMINE 30 MG/ML
30 INJECTION, SOLUTION INTRAMUSCULAR; INTRAVENOUS
Status: COMPLETED | OUTPATIENT
Start: 2021-06-24 | End: 2021-06-24

## 2021-06-24 RX ADMIN — KETOROLAC TROMETHAMINE 30 MG: 30 INJECTION, SOLUTION INTRAMUSCULAR; INTRAVENOUS at 10:06

## 2021-06-30 DIAGNOSIS — M54.2 NECK PAIN: Primary | ICD-10-CM

## 2021-07-20 ENCOUNTER — TELEPHONE (OUTPATIENT)
Dept: PAIN MEDICINE | Facility: CLINIC | Age: 36
End: 2021-07-20

## 2022-03-09 ENCOUNTER — OFFICE VISIT (OUTPATIENT)
Dept: FAMILY MEDICINE | Facility: CLINIC | Age: 37
End: 2022-03-09
Payer: COMMERCIAL

## 2022-03-09 VITALS
BODY MASS INDEX: 33.41 KG/M2 | WEIGHT: 207.88 LBS | HEART RATE: 76 BPM | TEMPERATURE: 99 F | OXYGEN SATURATION: 99 % | DIASTOLIC BLOOD PRESSURE: 70 MMHG | HEIGHT: 66 IN | RESPIRATION RATE: 16 BRPM | SYSTOLIC BLOOD PRESSURE: 114 MMHG

## 2022-03-09 DIAGNOSIS — R06.09 DYSPNEA ON EXERTION: Primary | ICD-10-CM

## 2022-03-09 DIAGNOSIS — E66.9 OBESITY (BMI 30.0-34.9): ICD-10-CM

## 2022-03-09 DIAGNOSIS — M79.609 POPLITEAL PAIN: ICD-10-CM

## 2022-03-09 PROCEDURE — 1160F RVW MEDS BY RX/DR IN RCRD: CPT | Mod: CPTII,S$GLB,, | Performed by: NURSE PRACTITIONER

## 2022-03-09 PROCEDURE — 3078F PR MOST RECENT DIASTOLIC BLOOD PRESSURE < 80 MM HG: ICD-10-PCS | Mod: CPTII,S$GLB,, | Performed by: NURSE PRACTITIONER

## 2022-03-09 PROCEDURE — 3078F DIAST BP <80 MM HG: CPT | Mod: CPTII,S$GLB,, | Performed by: NURSE PRACTITIONER

## 2022-03-09 PROCEDURE — 3008F BODY MASS INDEX DOCD: CPT | Mod: CPTII,S$GLB,, | Performed by: NURSE PRACTITIONER

## 2022-03-09 PROCEDURE — 99214 PR OFFICE/OUTPT VISIT, EST, LEVL IV, 30-39 MIN: ICD-10-PCS | Mod: S$GLB,,, | Performed by: NURSE PRACTITIONER

## 2022-03-09 PROCEDURE — 1160F PR REVIEW ALL MEDS BY PRESCRIBER/CLIN PHARMACIST DOCUMENTED: ICD-10-PCS | Mod: CPTII,S$GLB,, | Performed by: NURSE PRACTITIONER

## 2022-03-09 PROCEDURE — 3008F PR BODY MASS INDEX (BMI) DOCUMENTED: ICD-10-PCS | Mod: CPTII,S$GLB,, | Performed by: NURSE PRACTITIONER

## 2022-03-09 PROCEDURE — 1159F MED LIST DOCD IN RCRD: CPT | Mod: CPTII,S$GLB,, | Performed by: NURSE PRACTITIONER

## 2022-03-09 PROCEDURE — 99999 PR PBB SHADOW E&M-EST. PATIENT-LVL IV: ICD-10-PCS | Mod: PBBFAC,,, | Performed by: NURSE PRACTITIONER

## 2022-03-09 PROCEDURE — 1159F PR MEDICATION LIST DOCUMENTED IN MEDICAL RECORD: ICD-10-PCS | Mod: CPTII,S$GLB,, | Performed by: NURSE PRACTITIONER

## 2022-03-09 PROCEDURE — 99999 PR PBB SHADOW E&M-EST. PATIENT-LVL IV: CPT | Mod: PBBFAC,,, | Performed by: NURSE PRACTITIONER

## 2022-03-09 PROCEDURE — 99214 OFFICE O/P EST MOD 30 MIN: CPT | Mod: S$GLB,,, | Performed by: NURSE PRACTITIONER

## 2022-03-09 PROCEDURE — 3074F SYST BP LT 130 MM HG: CPT | Mod: CPTII,S$GLB,, | Performed by: NURSE PRACTITIONER

## 2022-03-09 PROCEDURE — 3074F PR MOST RECENT SYSTOLIC BLOOD PRESSURE < 130 MM HG: ICD-10-PCS | Mod: CPTII,S$GLB,, | Performed by: NURSE PRACTITIONER

## 2022-03-09 NOTE — PROGRESS NOTES
Subjective:      Patient ID: Caty Zapata is a 37 y.o. female.  New to me but seen previously in clinic by a fellow provider. Pt presents with new right posterior knee pain that began a few months ago. Pt reports having Covid beginning December 2021, then 2 weeks later drove to [facility] for a family vacation when pain started. Pain and swelling persisted. Then 1 month later took another family trip to [place]. During that drive she began with right chest pain and dyspnea with activity. Reports symptoms continue to worsen.     Leg Pain   The incident occurred more than 1 week ago. The injury mechanism was a compression. The pain is present in the right knee. The quality of the pain is described as aching. The pain is at a severity of 10/10. The pain is severe. The pain has been constant since onset. Associated symptoms include an inability to bear weight and a loss of motion. Pertinent negatives include no loss of sensation, muscle weakness, numbness or tingling. She reports no foreign bodies present. The symptoms are aggravated by movement, palpation and weight bearing. She has tried rest for the symptoms. The treatment provided no relief.   Shortness of Breath  This is a new problem. The current episode started more than 1 month ago. The problem occurs daily. The problem has been waxing and waning. Associated symptoms include chest pain (pressure/tightness), claudication, leg pain and leg swelling. Pertinent negatives include no abdominal pain, coryza, ear pain, fever, headaches, hemoptysis, neck pain, orthopnea, PND, rash, rhinorrhea, sore throat, sputum production, swollen glands, syncope, vomiting or wheezing. The symptoms are aggravated by any activity. Risk factors include prolonged immobilization and smoking. She has tried rest for the symptoms. The treatment provided moderate relief. There is no history of allergies, aspirin allergies, asthma, bronchiolitis, CAD, chronic lung disease, COPD, DVT, a heart  "failure, PE, pneumonia or a recent surgery.     Review of Systems   Constitutional: Negative for activity change, appetite change, fever and unexpected weight change.   HENT: Negative for ear pain, rhinorrhea and sore throat.    Respiratory: Positive for chest tightness and shortness of breath. Negative for cough, hemoptysis, sputum production and wheezing.    Cardiovascular: Positive for chest pain (pressure/tightness), claudication, leg swelling and claudication. Negative for palpitations, orthopnea, syncope and PND.   Gastrointestinal: Negative for abdominal pain, change in bowel habit, constipation, diarrhea, nausea, vomiting and change in bowel habit.   Genitourinary: Negative.  Negative for difficulty urinating and dysuria.   Musculoskeletal: Positive for arthralgias, gait problem, joint swelling and leg pain. Negative for back pain, myalgias, neck pain, neck stiffness and joint deformity.   Integumentary:  Negative for rash.   Neurological: Negative for tingling, numbness and headaches.   All other systems reviewed and are negative.        Objective:     Vitals:    03/09/22 1529   BP: 114/70   Pulse: 76   Resp: 16   Temp: 98.6 °F (37 °C)   TempSrc: Oral   SpO2: 99%   Weight: 94.3 kg (207 lb 14.3 oz)   Height: 5' 6" (1.676 m)     Physical Exam  Vitals and nursing note reviewed.   Constitutional:       General: She is not in acute distress.     Appearance: Normal appearance. She is well-developed, well-groomed and overweight. She is not ill-appearing.   HENT:      Head: Normocephalic and atraumatic.      Right Ear: External ear normal.      Left Ear: External ear normal.      Nose: Nose normal.      Mouth/Throat:      Lips: Pink.      Mouth: Mucous membranes are moist.   Eyes:      General: Lids are normal. Vision grossly intact. Gaze aligned appropriately.      Conjunctiva/sclera: Conjunctivae normal.      Pupils: Pupils are equal, round, and reactive to light.   Neck:      Trachea: Trachea and phonation " normal.   Cardiovascular:      Rate and Rhythm: Normal rate and regular rhythm.      Pulses: Normal pulses.      Heart sounds: Normal heart sounds.   Pulmonary:      Effort: Pulmonary effort is normal. No accessory muscle usage or respiratory distress.      Breath sounds: Normal breath sounds and air entry.   Abdominal:      General: Abdomen is flat. Bowel sounds are normal. There is no distension.      Palpations: Abdomen is soft.      Tenderness: There is no abdominal tenderness.   Musculoskeletal:      Cervical back: Normal range of motion and neck supple.      Right upper leg: Normal. No tenderness.      Left upper leg: Normal. No tenderness.      Right knee: Swelling present. No deformity, effusion, erythema, ecchymosis, lacerations, bony tenderness or crepitus. Decreased range of motion (limited flexion). Tenderness (popiteal fossa) present. Normal alignment, normal meniscus and normal patellar mobility. Normal pulse.      Left knee: Normal.      Right lower leg: Normal. No tenderness. No edema.      Left lower leg: Normal. No tenderness. No edema.      Right ankle: Normal.      Left ankle: Normal.        Legs:    Skin:     General: Skin is warm and dry.      Findings: No rash.   Neurological:      General: No focal deficit present.      Mental Status: She is alert and oriented to person, place, and time. Mental status is at baseline.      Cranial Nerves: No cranial nerve deficit.      Sensory: No sensory deficit.      Motor: Motor function is intact.      Gait: Gait abnormal (antalgic).   Psychiatric:         Attention and Perception: Attention and perception normal.         Mood and Affect: Mood and affect normal.         Speech: Speech normal.         Behavior: Behavior normal. Behavior is cooperative.         Thought Content: Thought content normal.         Cognition and Memory: Cognition and memory normal.         Judgment: Judgment normal.       Assessment and Plan:     1. Dyspnea on exertion  No  respiratory distress noted, does not appear to be cardiac in nature, labs ordered, further orders pending results  - CBC Auto Differential; Future  - Comprehensive Metabolic Panel; Future  - D dimer, quantitative; Future  - Sedimentation rate; Future  - C-reactive protein; Future    2. Popliteal pain  DVT evaluation, joint structurally intact and do not suspect derangement, additional orders pending results  - US Lower Extremity Veins Bilateral; Future  - CBC Auto Differential; Future  - Comprehensive Metabolic Panel; Future  - Sedimentation rate; Future  - C-reactive protein; Future    3. Obesity (BMI 30.0-34.9)  The patient is asked to make an attempt to improve diet and exercise patterns to aid in medical management of this problem.  - Lipid Panel; Future  - TSH; Future  - Hemoglobin A1C; Future           FRENCH Uribe, FNP-C  Family/Internal Medicine  [facility]

## 2022-03-10 ENCOUNTER — TELEPHONE (OUTPATIENT)
Dept: FAMILY MEDICINE | Facility: CLINIC | Age: 37
End: 2022-03-10
Payer: COMMERCIAL

## 2022-03-10 ENCOUNTER — HOSPITAL ENCOUNTER (OUTPATIENT)
Dept: RADIOLOGY | Facility: HOSPITAL | Age: 37
Discharge: HOME | End: 2022-03-10
Attending: NURSE PRACTITIONER
Payer: COMMERCIAL

## 2022-03-10 DIAGNOSIS — M79.609 POPLITEAL PAIN: ICD-10-CM

## 2022-03-10 PROCEDURE — 93970 US LOWER EXTREMITY VEINS BILATERAL: ICD-10-PCS | Mod: 26,,, | Performed by: RADIOLOGY

## 2022-03-10 PROCEDURE — 93970 EXTREMITY STUDY: CPT | Mod: TC

## 2022-03-10 PROCEDURE — 93970 EXTREMITY STUDY: CPT | Mod: 26,,, | Performed by: RADIOLOGY

## 2022-03-10 NOTE — TELEPHONE ENCOUNTER
Patient return call to office and was informed of provider response. Patient acknowledged understanding.

## 2022-03-10 NOTE — TELEPHONE ENCOUNTER
----- Message from Andria King NP sent at 3/10/2022  3:57 PM CST -----  No blood clot or acute abnormality seen on ultrasound. Follow up with ortho if pain and swelling persist

## 2022-03-11 ENCOUNTER — TELEPHONE (OUTPATIENT)
Dept: FAMILY MEDICINE | Facility: CLINIC | Age: 37
End: 2022-03-11
Payer: COMMERCIAL

## 2022-03-11 DIAGNOSIS — E78.2 MIXED HYPERLIPIDEMIA: ICD-10-CM

## 2022-03-11 RX ORDER — ATORVASTATIN CALCIUM 20 MG/1
20 TABLET, FILM COATED ORAL DAILY
Qty: 90 TABLET | Refills: 3 | Status: SHIPPED | OUTPATIENT
Start: 2022-03-11 | End: 2023-09-15

## 2022-03-11 NOTE — TELEPHONE ENCOUNTER
----- Message from Andria King NP sent at 3/11/2022 10:35 AM CST -----  Cholesterol is elevated, start atorvastatin and recheck in 6 months

## 2023-08-09 ENCOUNTER — OFFICE VISIT (OUTPATIENT)
Dept: FAMILY MEDICINE | Facility: CLINIC | Age: 38
End: 2023-08-09
Payer: COMMERCIAL

## 2023-08-09 ENCOUNTER — LAB VISIT (OUTPATIENT)
Dept: LAB | Facility: HOSPITAL | Age: 38
End: 2023-08-09
Attending: NURSE PRACTITIONER
Payer: COMMERCIAL

## 2023-08-09 VITALS
WEIGHT: 203.06 LBS | BODY MASS INDEX: 32.64 KG/M2 | DIASTOLIC BLOOD PRESSURE: 70 MMHG | HEART RATE: 93 BPM | HEIGHT: 66 IN | SYSTOLIC BLOOD PRESSURE: 118 MMHG | OXYGEN SATURATION: 97 % | TEMPERATURE: 98 F

## 2023-08-09 DIAGNOSIS — R07.9 CHEST PAIN, UNSPECIFIED TYPE: Primary | ICD-10-CM

## 2023-08-09 DIAGNOSIS — Z72.0 TOBACCO USE: ICD-10-CM

## 2023-08-09 DIAGNOSIS — E78.2 MIXED HYPERLIPIDEMIA: ICD-10-CM

## 2023-08-09 DIAGNOSIS — R07.9 CHEST PAIN, UNSPECIFIED TYPE: ICD-10-CM

## 2023-08-09 LAB
ALBUMIN SERPL BCP-MCNC: 3.6 G/DL (ref 3.5–5.2)
ALP SERPL-CCNC: 70 U/L (ref 55–135)
ALT SERPL W/O P-5'-P-CCNC: 19 U/L (ref 10–44)
ANION GAP SERPL CALC-SCNC: 5 MMOL/L (ref 8–16)
AST SERPL-CCNC: 20 U/L (ref 10–40)
BASOPHILS # BLD AUTO: 0.04 K/UL (ref 0–0.2)
BASOPHILS NFR BLD: 0.7 % (ref 0–1.9)
BILIRUB SERPL-MCNC: 0.2 MG/DL (ref 0.1–1)
BNP SERPL-MCNC: <10 PG/ML (ref 0–99)
BUN SERPL-MCNC: 10 MG/DL (ref 6–20)
CALCIUM SERPL-MCNC: 8.7 MG/DL (ref 8.7–10.5)
CHLORIDE SERPL-SCNC: 107 MMOL/L (ref 95–110)
CHOLEST SERPL-MCNC: 191 MG/DL (ref 120–199)
CHOLEST/HDLC SERPL: 5 {RATIO} (ref 2–5)
CO2 SERPL-SCNC: 26 MMOL/L (ref 23–29)
CREAT SERPL-MCNC: 0.9 MG/DL (ref 0.5–1.4)
D DIMER PPP IA.FEU-MCNC: <0.19 MG/L FEU
DIFFERENTIAL METHOD: ABNORMAL
EOSINOPHIL # BLD AUTO: 0.2 K/UL (ref 0–0.5)
EOSINOPHIL NFR BLD: 2.7 % (ref 0–8)
ERYTHROCYTE [DISTWIDTH] IN BLOOD BY AUTOMATED COUNT: 20.6 % (ref 11.5–14.5)
EST. GFR  (NO RACE VARIABLE): >60 ML/MIN/1.73 M^2
GLUCOSE SERPL-MCNC: 99 MG/DL (ref 70–110)
HCT VFR BLD AUTO: 31.3 % (ref 37–48.5)
HDLC SERPL-MCNC: 38 MG/DL (ref 40–75)
HDLC SERPL: 19.9 % (ref 20–50)
HGB BLD-MCNC: 9 G/DL (ref 12–16)
IMM GRANULOCYTES # BLD AUTO: 0.01 K/UL (ref 0–0.04)
IMM GRANULOCYTES NFR BLD AUTO: 0.2 % (ref 0–0.5)
LDLC SERPL CALC-MCNC: 129 MG/DL (ref 63–159)
LYMPHOCYTES # BLD AUTO: 2.1 K/UL (ref 1–4.8)
LYMPHOCYTES NFR BLD: 35.4 % (ref 18–48)
MAGNESIUM SERPL-MCNC: 2 MG/DL (ref 1.6–2.6)
MCH RBC QN AUTO: 19.1 PG (ref 27–31)
MCHC RBC AUTO-ENTMCNC: 28.8 G/DL (ref 32–36)
MCV RBC AUTO: 66 FL (ref 82–98)
MONOCYTES # BLD AUTO: 0.9 K/UL (ref 0.3–1)
MONOCYTES NFR BLD: 15.2 % (ref 4–15)
NEUTROPHILS # BLD AUTO: 2.7 K/UL (ref 1.8–7.7)
NEUTROPHILS NFR BLD: 45.8 % (ref 38–73)
NONHDLC SERPL-MCNC: 153 MG/DL
NRBC BLD-RTO: 0 /100 WBC
PLATELET # BLD AUTO: 437 K/UL (ref 150–450)
PMV BLD AUTO: 9.4 FL (ref 9.2–12.9)
POTASSIUM SERPL-SCNC: 4.7 MMOL/L (ref 3.5–5.1)
PROT SERPL-MCNC: 7.3 G/DL (ref 6–8.4)
RBC # BLD AUTO: 4.72 M/UL (ref 4–5.4)
SODIUM SERPL-SCNC: 138 MMOL/L (ref 136–145)
TRIGL SERPL-MCNC: 120 MG/DL (ref 30–150)
TROPONIN I SERPL DL<=0.01 NG/ML-MCNC: <0.006 NG/ML (ref 0–0.03)
WBC # BLD AUTO: 5.91 K/UL (ref 3.9–12.7)

## 2023-08-09 PROCEDURE — 84484 ASSAY OF TROPONIN QUANT: CPT | Performed by: NURSE PRACTITIONER

## 2023-08-09 PROCEDURE — 85025 COMPLETE CBC W/AUTO DIFF WBC: CPT | Performed by: NURSE PRACTITIONER

## 2023-08-09 PROCEDURE — 36415 COLL VENOUS BLD VENIPUNCTURE: CPT | Performed by: NURSE PRACTITIONER

## 2023-08-09 PROCEDURE — 80053 COMPREHEN METABOLIC PANEL: CPT | Performed by: NURSE PRACTITIONER

## 2023-08-09 PROCEDURE — 3008F BODY MASS INDEX DOCD: CPT | Mod: CPTII,S$GLB,, | Performed by: NURSE PRACTITIONER

## 2023-08-09 PROCEDURE — 3074F SYST BP LT 130 MM HG: CPT | Mod: CPTII,S$GLB,, | Performed by: NURSE PRACTITIONER

## 2023-08-09 PROCEDURE — 83880 ASSAY OF NATRIURETIC PEPTIDE: CPT | Performed by: NURSE PRACTITIONER

## 2023-08-09 PROCEDURE — 3008F PR BODY MASS INDEX (BMI) DOCUMENTED: ICD-10-PCS | Mod: CPTII,S$GLB,, | Performed by: NURSE PRACTITIONER

## 2023-08-09 PROCEDURE — 3074F PR MOST RECENT SYSTOLIC BLOOD PRESSURE < 130 MM HG: ICD-10-PCS | Mod: CPTII,S$GLB,, | Performed by: NURSE PRACTITIONER

## 2023-08-09 PROCEDURE — 80061 LIPID PANEL: CPT | Performed by: NURSE PRACTITIONER

## 2023-08-09 PROCEDURE — 93005 ELECTROCARDIOGRAM TRACING: CPT | Mod: S$GLB,,, | Performed by: NURSE PRACTITIONER

## 2023-08-09 PROCEDURE — 99999 PR PBB SHADOW E&M-EST. PATIENT-LVL III: ICD-10-PCS | Mod: PBBFAC,,, | Performed by: NURSE PRACTITIONER

## 2023-08-09 PROCEDURE — 93010 ELECTROCARDIOGRAM REPORT: CPT | Mod: S$GLB,,, | Performed by: INTERNAL MEDICINE

## 2023-08-09 PROCEDURE — 85379 FIBRIN DEGRADATION QUANT: CPT | Performed by: NURSE PRACTITIONER

## 2023-08-09 PROCEDURE — 3078F DIAST BP <80 MM HG: CPT | Mod: CPTII,S$GLB,, | Performed by: NURSE PRACTITIONER

## 2023-08-09 PROCEDURE — 3078F PR MOST RECENT DIASTOLIC BLOOD PRESSURE < 80 MM HG: ICD-10-PCS | Mod: CPTII,S$GLB,, | Performed by: NURSE PRACTITIONER

## 2023-08-09 PROCEDURE — 93005 EKG 12-LEAD: ICD-10-PCS | Mod: S$GLB,,, | Performed by: NURSE PRACTITIONER

## 2023-08-09 PROCEDURE — 93010 EKG 12-LEAD: ICD-10-PCS | Mod: S$GLB,,, | Performed by: INTERNAL MEDICINE

## 2023-08-09 PROCEDURE — 99214 PR OFFICE/OUTPT VISIT, EST, LEVL IV, 30-39 MIN: ICD-10-PCS | Mod: S$GLB,,, | Performed by: NURSE PRACTITIONER

## 2023-08-09 PROCEDURE — 99999 PR PBB SHADOW E&M-EST. PATIENT-LVL III: CPT | Mod: PBBFAC,,, | Performed by: NURSE PRACTITIONER

## 2023-08-09 PROCEDURE — 83735 ASSAY OF MAGNESIUM: CPT | Performed by: NURSE PRACTITIONER

## 2023-08-09 PROCEDURE — 99214 OFFICE O/P EST MOD 30 MIN: CPT | Mod: S$GLB,,, | Performed by: NURSE PRACTITIONER

## 2023-08-09 NOTE — PROGRESS NOTES
Subjective:       Patient ID: Caty Zapata is a 38 y.o. female.    Chief Complaint: Chest Pain and body pain    HPI     Caty Zapata is a 38 y.o. female patient that presents to clinic with a chief complaint of chest pain. Past medical and surgical history reviewed as listed. PCP is Gisella Sam MD , she is  new  to me.     Chest Pain   This is a new problem. The current episode started more than 1 month ago. The problem occurs intermittently. The problem has been waxing and waning. The pain is present in the lateral region (left side). The pain is moderate. The quality of the pain is described as numbness and heavy (fingers have tingling). The pain radiates to the left arm. Associated symptoms include numbness, palpitations and shortness of breath. Pertinent negatives include no dizziness, irregular heartbeat, near-syncope, vomiting or weakness. The pain is aggravated by nothing. She has tried nothing for the symptoms. Risk factors include smoking/tobacco exposure and obesity (smokes 1 pack every 1-2 days).   Her family medical history is significant for heart disease and hypertension.   Pertinent negatives for family medical history include: no early MI, no stroke and no TIA.     Denies family history of sudden death.     Past Medical History:   Diagnosis Date    Abnormal Pap smear     Cervical cancer     Vaginal delivery     x1      Past Surgical History:   Procedure Laterality Date    KNEE SURGERY  2015    patellar surgery. dr. george HARTLEY   2002    of cervix      Family History   Problem Relation Age of Onset    Diabetes Mother     Hypertension Mother     Bell's palsy Mother     Stroke Paternal Grandfather     Cervical cancer Sister     Breast cancer Maternal Grandmother     Breast cancer Maternal Aunt     Colon cancer Neg Hx     Ovarian cancer Neg Hx       Review of patient's allergies indicates:  No Known Allergies  Review of Systems   Respiratory:  Positive for shortness of breath.   "  Cardiovascular:  Positive for chest pain and palpitations. Negative for near-syncope.   Gastrointestinal:  Negative for vomiting.   Neurological:  Positive for numbness. Negative for dizziness and weakness.       Objective:      Vitals:    08/09/23 1518   BP: 118/70   Pulse: 93   Temp: 98.1 °F (36.7 °C)   TempSrc: Oral   SpO2: 97%   Weight: 92.1 kg (203 lb 0.7 oz)   Height: 5' 6" (1.676 m)      Physical Exam  Vitals and nursing note reviewed.   Constitutional:       General: She is not in acute distress.     Appearance: Normal appearance.   HENT:      Head: Normocephalic and atraumatic.      Right Ear: Tympanic membrane normal.      Left Ear: Tympanic membrane normal.      Nose: Nose normal.      Mouth/Throat:      Mouth: Mucous membranes are moist.      Pharynx: No posterior oropharyngeal erythema.   Eyes:      Extraocular Movements: Extraocular movements intact.      Conjunctiva/sclera: Conjunctivae normal.      Pupils: Pupils are equal, round, and reactive to light.   Cardiovascular:      Rate and Rhythm: Normal rate and regular rhythm.      Heart sounds: Normal heart sounds. No murmur heard.  Pulmonary:      Effort: Pulmonary effort is normal. No respiratory distress.      Breath sounds: Normal breath sounds.   Musculoskeletal:      Cervical back: Normal range of motion and neck supple.      Right lower leg: No edema.      Left lower leg: No edema.   Lymphadenopathy:      Cervical: No cervical adenopathy.   Skin:     General: Skin is warm and dry.      Capillary Refill: Capillary refill takes less than 2 seconds.      Findings: No rash.   Neurological:      Mental Status: She is alert and oriented to person, place, and time.      Gait: Gait normal.   Psychiatric:         Mood and Affect: Mood normal.         Behavior: Behavior normal.         Lab Results   Component Value Date    WBC 5.91 08/09/2023    HGB 9.0 (L) 08/09/2023    HCT 31.3 (L) 08/09/2023     08/09/2023    CHOL 191 08/09/2023    TRIG 120 " 08/09/2023    HDL 38 (L) 08/09/2023    ALT 19 08/09/2023    AST 20 08/09/2023     08/09/2023    K 4.7 08/09/2023     08/09/2023    CREATININE 0.9 08/09/2023    BUN 10 08/09/2023    CO2 26 08/09/2023    TSH 1.351 03/09/2022    INR 1.0 12/01/2013    HGBA1C 5.2 03/09/2022      Assessment:       1. Chest pain, unspecified type    2. Tobacco use    3. Mixed hyperlipidemia        Plan:       Chest pain, unspecified type  EKG normal sinus rhythm in clinic with no changes in ST segment or QRS.   Advised patient to go to ED if she experiences worsening chest pain, shortness of breath, weakness, facial droop, or any other symptoms. Patient verbalized understanding.   Will check labs.  -     IN OFFICE EKG 12-LEAD (to [place])  -     Ambulatory referral/consult to Cardiology; Future; Expected date: 08/09/2023  -     CBC W/ AUTO DIFFERENTIAL; Future; Expected date: 08/09/2023  -     COMPREHENSIVE METABOLIC PANEL; Future; Expected date: 08/09/2023  -     Magnesium; Future; Expected date: 08/09/2023  -     D-DIMER, QUANTITATIVE; Future; Expected date: 08/09/2023  -     TROPONIN I; Future; Expected date: 08/09/2023  -     B-TYPE NATRIURETIC PEPTIDE; Future; Expected date: 08/09/2023  -     LIPID PANEL; Future; Expected date: 08/09/2023    Tobacco use  Recommend cessation.     Mixed hyperlipidemia  Low cholesterol diet and daily aerobic exercise.   Declines atorvastatin.     Medication List with Changes/Refills   Current Medications    ATORVASTATIN (LIPITOR) 20 MG TABLET    Take 1 tablet (20 mg total) by mouth once daily.         Follow up if symptoms worsen or fail to improve.        Lisa Norris, DNP, APRN, FNP-C  Family Medicine  [facility]

## 2023-08-10 ENCOUNTER — TELEPHONE (OUTPATIENT)
Dept: FAMILY MEDICINE | Facility: CLINIC | Age: 38
End: 2023-08-10
Payer: COMMERCIAL

## 2023-08-10 NOTE — TELEPHONE ENCOUNTER
----- Message from Lisa Norris DNP sent at 8/10/2023  8:29 AM CDT -----  Please contact patient and notify patient I have reviewed lab results.     Good cholesterol is slightly below normal. Normal electrolytes, kidney and liver function.     Your blood count is slightly below normal. You have anemia.   Your heart enzymes are normal.   Your d dimer level is below normal. No blood clot.     I recommend a multivitamin with iron. We can recheck your blood count in 4-6 weeks.   I still think you should follow up with cardiology for further evaluation of chest pain.

## 2023-08-14 ENCOUNTER — OFFICE VISIT (OUTPATIENT)
Dept: OBSTETRICS AND GYNECOLOGY | Facility: CLINIC | Age: 38
End: 2023-08-14
Payer: COMMERCIAL

## 2023-08-14 VITALS
HEIGHT: 66 IN | WEIGHT: 202.25 LBS | SYSTOLIC BLOOD PRESSURE: 110 MMHG | DIASTOLIC BLOOD PRESSURE: 60 MMHG | BODY MASS INDEX: 32.5 KG/M2

## 2023-08-14 DIAGNOSIS — L02.32 BOIL OF BUTTOCK: ICD-10-CM

## 2023-08-14 DIAGNOSIS — Z30.09 FAMILY PLANNING COUNSELING: ICD-10-CM

## 2023-08-14 DIAGNOSIS — Z12.4 CERVICAL CANCER SCREENING: ICD-10-CM

## 2023-08-14 DIAGNOSIS — Z01.419 WELL WOMAN EXAM WITH ROUTINE GYNECOLOGICAL EXAM: Primary | ICD-10-CM

## 2023-08-14 PROCEDURE — 1159F MED LIST DOCD IN RCRD: CPT | Mod: CPTII,S$GLB,, | Performed by: OBSTETRICS & GYNECOLOGY

## 2023-08-14 PROCEDURE — 99999 PR PBB SHADOW E&M-EST. PATIENT-LVL III: CPT | Mod: PBBFAC,,, | Performed by: OBSTETRICS & GYNECOLOGY

## 2023-08-14 PROCEDURE — 3074F PR MOST RECENT SYSTOLIC BLOOD PRESSURE < 130 MM HG: ICD-10-PCS | Mod: CPTII,S$GLB,, | Performed by: OBSTETRICS & GYNECOLOGY

## 2023-08-14 PROCEDURE — 87624 HPV HI-RISK TYP POOLED RSLT: CPT | Performed by: OBSTETRICS & GYNECOLOGY

## 2023-08-14 PROCEDURE — 99999 PR PBB SHADOW E&M-EST. PATIENT-LVL III: ICD-10-PCS | Mod: PBBFAC,,, | Performed by: OBSTETRICS & GYNECOLOGY

## 2023-08-14 PROCEDURE — 3008F PR BODY MASS INDEX (BMI) DOCUMENTED: ICD-10-PCS | Mod: CPTII,S$GLB,, | Performed by: OBSTETRICS & GYNECOLOGY

## 2023-08-14 PROCEDURE — 1160F RVW MEDS BY RX/DR IN RCRD: CPT | Mod: CPTII,S$GLB,, | Performed by: OBSTETRICS & GYNECOLOGY

## 2023-08-14 PROCEDURE — 3008F BODY MASS INDEX DOCD: CPT | Mod: CPTII,S$GLB,, | Performed by: OBSTETRICS & GYNECOLOGY

## 2023-08-14 PROCEDURE — 3078F PR MOST RECENT DIASTOLIC BLOOD PRESSURE < 80 MM HG: ICD-10-PCS | Mod: CPTII,S$GLB,, | Performed by: OBSTETRICS & GYNECOLOGY

## 2023-08-14 PROCEDURE — 3074F SYST BP LT 130 MM HG: CPT | Mod: CPTII,S$GLB,, | Performed by: OBSTETRICS & GYNECOLOGY

## 2023-08-14 PROCEDURE — 1159F PR MEDICATION LIST DOCUMENTED IN MEDICAL RECORD: ICD-10-PCS | Mod: CPTII,S$GLB,, | Performed by: OBSTETRICS & GYNECOLOGY

## 2023-08-14 PROCEDURE — 99385 PREV VISIT NEW AGE 18-39: CPT | Mod: S$GLB,,, | Performed by: OBSTETRICS & GYNECOLOGY

## 2023-08-14 PROCEDURE — 1160F PR REVIEW ALL MEDS BY PRESCRIBER/CLIN PHARMACIST DOCUMENTED: ICD-10-PCS | Mod: CPTII,S$GLB,, | Performed by: OBSTETRICS & GYNECOLOGY

## 2023-08-14 PROCEDURE — 88175 CYTOPATH C/V AUTO FLUID REDO: CPT | Performed by: PATHOLOGY

## 2023-08-14 PROCEDURE — 99385 PR PREVENTIVE VISIT,NEW,18-39: ICD-10-PCS | Mod: S$GLB,,, | Performed by: OBSTETRICS & GYNECOLOGY

## 2023-08-14 PROCEDURE — 3078F DIAST BP <80 MM HG: CPT | Mod: CPTII,S$GLB,, | Performed by: OBSTETRICS & GYNECOLOGY

## 2023-08-14 RX ORDER — SULFAMETHOXAZOLE AND TRIMETHOPRIM 800; 160 MG/1; MG/1
1 TABLET ORAL 2 TIMES DAILY
Qty: 10 TABLET | Refills: 0 | Status: SHIPPED | OUTPATIENT
Start: 2023-08-14 | End: 2023-08-19

## 2023-08-14 NOTE — PROGRESS NOTES
"[facility]  Ambulatory Clinic  Obstetrics & Gynecology    Visit Date:  2023    Chief Complaint:  Annual GYN exam    History of Present Illness:      Caty Zapata is a 38 y.o. , new pt to me, here for a gynecologic exam.    Pt has no major complaints today.      Menses are regular, not heavy or painful.    Pt current method of family planning is natural family planning, and reports no problems with this method.      Pt denies h/o LGSIL pap.    Pt smokes tobacco.    Pt denies abnormal vaginal bleeding, vaginal discharge, dysmenorrhea, dyspareunia, pelvic pain, bloating, early satiety, unintentional weight loss, breast mass/skin changes, incontinence, GI or urinary complaints.      Otherwise, the pt is in her usual state of health.    Past History:  Gynecologic history as noted above.    Review of Systems:      GENERAL:  No fever, fatigue, excessive weight gain or loss  HEENT:  No headaches, hearing changes, visual disturbance  RESPIRATORY:  No cough, shortness of breath  CARDIOVASCULAR:  No chest pain, heart palpitations, leg swelling  BREAST:  No lump, pain, nipple discharge, skin changes  GASTROINTESTINAL:  No nausea, vomiting, constipation, diarrhea, abd pain, rectal bleeding   GENITOURINARY:  See HPI  ENDOCRINE:  No heat or cold intolerance  HEMATOLOGIC:  No easy bruisability or bleeding   LYMPHATICS:  No enlarged nodes  MUSCULOSKELETAL:  No acute joint pain or swelling  SKIN:  No rash, lesions, jaundice  NEUROLOGIC:  No dizziness, weakness, syncope  PSYCHIATRIC:  No significant mood changes, homicidal/suicidal ideations, abuse    Physical Exam:     /60   Ht 5' 6" (1.676 m)   Wt 91.7 kg (202 lb 4.4 oz)   LMP 2023   BMI 32.65 kg/m²   Pulse 50's, Resp rate 14     GENERAL:  No acute distress, well-nourished  HEENT:  Atraumatic, anicteric, moist mucus membranes. Neck supple w/o masses.  BREAST:  Symmetric, nontender, no obvious masses, adenopathy, skin changes or nipple " discharge.  LUNGS:  Clear normal respiratory effort  HEART:  Regular rate and rhythm  ABDOMEN:  Soft, non-tender, non-distended, normoactive bowel sounds, no obvious organomegaly  EXT:  Symmetric w/o cramping, claudication, or edema. +2 distal pulses.  SKIN:  No rashes or bruising  PSYCH:  Mood and affect appropriate  NEURO:  Grossly intact bilaterally    GENITOURINARY:    VULVAR:  Female external genitalia w/o obvious lesions. Female hair distribution. Normal urethral meatus. No gross lymphadenopathy.   VAGINA:  Normal vaginal mucosa. Good support. No obvious lesion. No discharge.  CERVIX:  No cervical motion tenderness, discharge, or obvious lesions.   UTERUS:  Small, non-tender, normal contour  ADNEXA:  No masses, non-tender    RECTAL:  Deferred. No obvious external lesions    Chaperone present for exam.    Assessment:     38 y.o. :    Well woman gynecologic exam  H/o LGSIL pap  Family planning  Boil on buttock    Plan:    A gynecologic health assessment was performed with age appropriate counseling.    Cervical cancer screening - pap obtained.    STI screening - pt declined.      Discussed contraceptive options.  Pt is considering Mirena IUD and will notify us of her decision.  Risks, benefits, and alternatives to IUD discussed.    Pt reports h/o boils on buttocks and is requesting Bactrim for future use.  Hibiclens prn, hygiene advice.  Infectious precautions.     Encourage healthy lifestyle modifications, monthly self breast exams, tobacco cessation, and f/u with PCP for health maintenance.    Return 1 year for gynecologic exam, or sooner as needed.  All questions answered, pt voiced understanding.        Clive Hernandez MD

## 2023-08-17 LAB
HPV HR 12 DNA SPEC QL NAA+PROBE: POSITIVE
HPV16 AG SPEC QL: NEGATIVE
HPV18 DNA SPEC QL NAA+PROBE: NEGATIVE

## 2023-08-21 LAB
FINAL PATHOLOGIC DIAGNOSIS: ABNORMAL
Lab: ABNORMAL

## 2023-08-23 ENCOUNTER — TELEPHONE (OUTPATIENT)
Dept: OBSTETRICS AND GYNECOLOGY | Facility: CLINIC | Age: 38
End: 2023-08-23
Payer: COMMERCIAL

## 2023-08-23 NOTE — TELEPHONE ENCOUNTER
----- Message from Clive Hernandez MD sent at 8/22/2023 12:11 PM CDT -----  Please notify pt her pap smear was ABNORMAL.  Schedule pt for a colposcopy.   Timely f/u strongly advised.  Thanks

## 2023-08-23 NOTE — TELEPHONE ENCOUNTER
Left message for pt. There is no opening for 9/11. Pt has been rescheduled for 9/15 with Dr Hernandez.       ----- Message from Yamini Alvarez sent at 8/23/2023  2:47 PM CDT -----  Regarding: patient call back  Type: Patient Call Back    Who called: Self     What is the request in detail: Asked for her procedure apt to be RS to 9/11/23    Can the clinic reply by [facility]? No     Would the patient rather a call back or a response via [facility]? Call     Best call back number: .[phone]

## 2023-09-06 ENCOUNTER — TELEPHONE (OUTPATIENT)
Dept: OBSTETRICS AND GYNECOLOGY | Facility: CLINIC | Age: 38
End: 2023-09-06
Payer: COMMERCIAL

## 2023-09-06 NOTE — TELEPHONE ENCOUNTER
Spoke with pt and infomred per dr. Hernandez that she can come on 09/15 at 1130 for her procedure. VU.      ----- Message from Yamini Alvarez sent at 9/6/2023  3:00 PM CDT -----  Regarding: patient call back  Type: Patient Call Back    Who called: Self     What is the request in detail: Asked for a call back to see if her procedure could be RS to the 11th.     Can the clinic reply by [facility]? No     Would the patient rather a call back or a response via [facility]? Call     Best call back number: .[phone]

## 2023-09-15 ENCOUNTER — PROCEDURE VISIT (OUTPATIENT)
Dept: OBSTETRICS AND GYNECOLOGY | Facility: CLINIC | Age: 38
End: 2023-09-15
Payer: COMMERCIAL

## 2023-09-15 VITALS
BODY MASS INDEX: 32.36 KG/M2 | WEIGHT: 201.38 LBS | DIASTOLIC BLOOD PRESSURE: 64 MMHG | HEIGHT: 66 IN | SYSTOLIC BLOOD PRESSURE: 102 MMHG

## 2023-09-15 DIAGNOSIS — R87.613 HGSIL (HIGH GRADE SQUAMOUS INTRAEPITHELIAL LESION) ON PAP SMEAR OF CERVIX: Primary | ICD-10-CM

## 2023-09-15 LAB
B-HCG UR QL: NEGATIVE
CTP QC/QA: YES

## 2023-09-15 PROCEDURE — 57454 PR COLPOSC,CERVIX W/ADJ VAG,W/BX & CURRETAG: ICD-10-PCS | Mod: S$GLB,,, | Performed by: OBSTETRICS & GYNECOLOGY

## 2023-09-15 PROCEDURE — 81025 URINE PREGNANCY TEST: CPT | Mod: S$GLB,,, | Performed by: OBSTETRICS & GYNECOLOGY

## 2023-09-15 PROCEDURE — 88305 TISSUE EXAM BY PATHOLOGIST: CPT | Mod: 26,,, | Performed by: PATHOLOGY

## 2023-09-15 PROCEDURE — 99499 UNLISTED E&M SERVICE: CPT | Mod: S$GLB,,, | Performed by: OBSTETRICS & GYNECOLOGY

## 2023-09-15 PROCEDURE — 81025 POCT URINE PREGNANCY: ICD-10-PCS | Mod: S$GLB,,, | Performed by: OBSTETRICS & GYNECOLOGY

## 2023-09-15 PROCEDURE — 88305 TISSUE EXAM BY PATHOLOGIST: CPT | Performed by: PATHOLOGY

## 2023-09-15 PROCEDURE — 57454 BX/CURETT OF CERVIX W/SCOPE: CPT | Mod: S$GLB,,, | Performed by: OBSTETRICS & GYNECOLOGY

## 2023-09-15 PROCEDURE — 99499 NO LOS: ICD-10-PCS | Mod: S$GLB,,, | Performed by: OBSTETRICS & GYNECOLOGY

## 2023-09-15 PROCEDURE — 88305 TISSUE EXAM BY PATHOLOGIST: ICD-10-PCS | Mod: 26,,, | Performed by: PATHOLOGY

## 2023-09-15 NOTE — PROCEDURES
"[facility]  Ambulatory Clinic   Obstetrics & Gynecology    Colposcopy Procedure Note  (CPT 47170)    Date:  9/15/2023    LMP:  23    UPT:  Negative    Indications:  Caty Zapata is a 38 y.o.  who presents for colposcopy secondary to HGSIL Pap smear.    Vitals:  /64 (BP Location: Left arm, Patient Position: Sitting, BP Method: Medium (Manual))   Ht 5' 6" (1.676 m)   Wt 91.3 kg (201 lb 6.2 oz)   LMP 2023 (Exact Date)   BMI 32.51 kg/m²     Procedure Details:     The risks and benefits of the procedure discussed and written informed consent obtained.  All questions were answered prior to the initiation of the procedure.  A time out was performed to identify the correct patient and procedure.  Speculum placed in vagina and excellent visualization of cervix achieved, cervix swabbed x 3 with acetic acid solution.  Ectocervical biopsy was performed of the described lesion.  ECC performed.  Specimens labelled and sent to Pathology.  Hemostasis was achieved with pressure.  All instruments removed.  The patient tolerated the procedure well.  Sterile technique maintained.  VSSAF.  Pain scale 0/10.    Findings:    Colposcopy satisfactory:  Yes  Cervix: acetowhite changes noted at 12, 5, 7 o'clock; otherwise, no visible lesions, no mosaicism, no punctation, no abnormal vasculature    Specimens:     12 o'clock ectocervix  5 o'clock ectocervix  7 o'clock ectocervix  ECC    Complications:  None      Colposcopy Impression:  HGSIL pap    Plan:    Specimens labelled and sent to Pathology.  Will base further treatment on Pathology findings.  Treatment options discussed with patient.  If her bx results is JOSE 1 or less, will repeat cotesting in 1 year.   If her bx results is JOSE 2 or greater, will schedule pt for f/u apt to discuss further treatment options.  Usual post procedural instructions and precautions reviewed.    Report excessive pain or bleeding to the office as soon as possible.  "   Patient was instructed to call office in 1 to 2 weeks for results and schedule f/u apt accordingly.     Importance of follow up stressed.  All of her questions answered, pt voiced understanding.      Clive Hernandez MD

## 2023-09-22 LAB
FINAL PATHOLOGIC DIAGNOSIS: NORMAL
Lab: NORMAL

## 2023-09-27 ENCOUNTER — PATIENT MESSAGE (OUTPATIENT)
Dept: OBSTETRICS AND GYNECOLOGY | Facility: CLINIC | Age: 38
End: 2023-09-27
Payer: COMMERCIAL

## 2023-10-30 ENCOUNTER — ANESTHESIA EVENT (OUTPATIENT)
Dept: SURGERY | Facility: HOSPITAL | Age: 38
End: 2023-10-30
Payer: COMMERCIAL

## 2023-10-30 DIAGNOSIS — R87.613 HIGH GRADE SQUAMOUS INTRAEPITHELIAL CERVICAL DYSPLASIA: Primary | ICD-10-CM

## 2023-10-30 DIAGNOSIS — R10.2 PELVIC CRAMPING: Primary | ICD-10-CM

## 2023-10-30 DIAGNOSIS — R87.613 HGSIL (HIGH GRADE SQUAMOUS INTRAEPITHELIAL LESION) ON PAP SMEAR OF CERVIX: ICD-10-CM

## 2023-11-01 ENCOUNTER — TELEPHONE (OUTPATIENT)
Dept: OBSTETRICS AND GYNECOLOGY | Facility: CLINIC | Age: 38
End: 2023-11-01
Payer: COMMERCIAL

## 2023-11-01 NOTE — TELEPHONE ENCOUNTER
Returned pt call to confirm her surgery and pre-op dates.     ----- Message from Kaykay Arevalo sent at 11/1/2023  1:37 PM CDT -----  Regarding: Self/  [phone]  Type: Patient Call Back    Who called: Patient    What is the request in detail:  Patient is needing a call from staff regarding her procedure scheduled for 11/8 ASA.  Thank you    Would the patient rather a call back or a response via [facility]?  Call back    Best call back number:  [phone]      Thank you

## 2023-11-08 ENCOUNTER — HOSPITAL ENCOUNTER (OUTPATIENT)
Dept: RADIOLOGY | Facility: HOSPITAL | Age: 38
Discharge: HOME | End: 2023-11-08
Attending: OBSTETRICS & GYNECOLOGY
Payer: COMMERCIAL

## 2023-11-08 ENCOUNTER — HOSPITAL ENCOUNTER (OUTPATIENT)
Dept: PREADMISSION TESTING | Facility: HOSPITAL | Age: 38
Discharge: HOME | End: 2023-11-08
Attending: OBSTETRICS & GYNECOLOGY
Payer: COMMERCIAL

## 2023-11-08 ENCOUNTER — OFFICE VISIT (OUTPATIENT)
Dept: OBSTETRICS AND GYNECOLOGY | Facility: CLINIC | Age: 38
End: 2023-11-08
Payer: COMMERCIAL

## 2023-11-08 ENCOUNTER — PROCEDURE VISIT (OUTPATIENT)
Dept: OBSTETRICS AND GYNECOLOGY | Facility: CLINIC | Age: 38
End: 2023-11-08
Payer: COMMERCIAL

## 2023-11-08 VITALS
SYSTOLIC BLOOD PRESSURE: 110 MMHG | WEIGHT: 202.25 LBS | SYSTOLIC BLOOD PRESSURE: 110 MMHG | DIASTOLIC BLOOD PRESSURE: 68 MMHG | BODY MASS INDEX: 32.5 KG/M2 | WEIGHT: 202.25 LBS | HEIGHT: 66 IN | HEIGHT: 66 IN | DIASTOLIC BLOOD PRESSURE: 68 MMHG | BODY MASS INDEX: 32.5 KG/M2

## 2023-11-08 VITALS
OXYGEN SATURATION: 100 % | WEIGHT: 202.25 LBS | HEIGHT: 66 IN | HEART RATE: 68 BPM | BODY MASS INDEX: 32.5 KG/M2 | SYSTOLIC BLOOD PRESSURE: 119 MMHG | DIASTOLIC BLOOD PRESSURE: 84 MMHG | RESPIRATION RATE: 18 BRPM

## 2023-11-08 DIAGNOSIS — Z01.818 PREOPERATIVE EXAM FOR GYNECOLOGIC SURGERY: Primary | ICD-10-CM

## 2023-11-08 DIAGNOSIS — R87.613 HIGH GRADE SQUAMOUS INTRAEPITHELIAL CERVICAL DYSPLASIA: Primary | ICD-10-CM

## 2023-11-08 DIAGNOSIS — Z01.818 PREOPERATIVE TESTING: Primary | ICD-10-CM

## 2023-11-08 DIAGNOSIS — Z32.02 PREGNANCY TEST NEGATIVE: ICD-10-CM

## 2023-11-08 DIAGNOSIS — R87.613 HIGH GRADE SQUAMOUS INTRAEPITHELIAL CERVICAL DYSPLASIA: ICD-10-CM

## 2023-11-08 LAB
ALBUMIN SERPL BCP-MCNC: 3.9 G/DL (ref 3.5–5.2)
ALP SERPL-CCNC: 76 U/L (ref 55–135)
ALT SERPL W/O P-5'-P-CCNC: 13 U/L (ref 10–44)
ANION GAP SERPL CALC-SCNC: 8 MMOL/L (ref 8–16)
AST SERPL-CCNC: 16 U/L (ref 10–40)
B-HCG UR QL: NEGATIVE
BASOPHILS # BLD AUTO: 0.05 K/UL (ref 0–0.2)
BASOPHILS NFR BLD: 0.7 % (ref 0–1.9)
BILIRUB SERPL-MCNC: 0.2 MG/DL (ref 0.1–1)
BUN SERPL-MCNC: 11 MG/DL (ref 6–20)
CALCIUM SERPL-MCNC: 9.4 MG/DL (ref 8.7–10.5)
CHLORIDE SERPL-SCNC: 106 MMOL/L (ref 95–110)
CO2 SERPL-SCNC: 21 MMOL/L (ref 23–29)
CREAT SERPL-MCNC: 0.9 MG/DL (ref 0.5–1.4)
CTP QC/QA: YES
DIFFERENTIAL METHOD: ABNORMAL
EOSINOPHIL # BLD AUTO: 0.2 K/UL (ref 0–0.5)
EOSINOPHIL NFR BLD: 3.1 % (ref 0–8)
ERYTHROCYTE [DISTWIDTH] IN BLOOD BY AUTOMATED COUNT: 20 % (ref 11.5–14.5)
EST. GFR  (NO RACE VARIABLE): >60 ML/MIN/1.73 M^2
GLUCOSE SERPL-MCNC: 86 MG/DL (ref 70–110)
HCT VFR BLD AUTO: 32.7 % (ref 37–48.5)
HGB BLD-MCNC: 9.6 G/DL (ref 12–16)
IMM GRANULOCYTES # BLD AUTO: 0.02 K/UL (ref 0–0.04)
IMM GRANULOCYTES NFR BLD AUTO: 0.3 % (ref 0–0.5)
LYMPHOCYTES # BLD AUTO: 2.9 K/UL (ref 1–4.8)
LYMPHOCYTES NFR BLD: 42.7 % (ref 18–48)
MCH RBC QN AUTO: 18.7 PG (ref 27–31)
MCHC RBC AUTO-ENTMCNC: 29.4 G/DL (ref 32–36)
MCV RBC AUTO: 64 FL (ref 82–98)
MONOCYTES # BLD AUTO: 0.9 K/UL (ref 0.3–1)
MONOCYTES NFR BLD: 13.9 % (ref 4–15)
NEUTROPHILS # BLD AUTO: 2.6 K/UL (ref 1.8–7.7)
NEUTROPHILS NFR BLD: 39.3 % (ref 38–73)
NRBC BLD-RTO: 0 /100 WBC
PLATELET # BLD AUTO: 463 K/UL (ref 150–450)
PMV BLD AUTO: 9.2 FL (ref 9.2–12.9)
POTASSIUM SERPL-SCNC: 5.1 MMOL/L (ref 3.5–5.1)
PROT SERPL-MCNC: 7.9 G/DL (ref 6–8.4)
RBC # BLD AUTO: 5.13 M/UL (ref 4–5.4)
SODIUM SERPL-SCNC: 135 MMOL/L (ref 136–145)
WBC # BLD AUTO: 6.68 K/UL (ref 3.9–12.7)

## 2023-11-08 PROCEDURE — 58100 PR BIOPSY OF UTERUS LINING: ICD-10-PCS | Mod: S$GLB,,, | Performed by: OBSTETRICS & GYNECOLOGY

## 2023-11-08 PROCEDURE — 88305 TISSUE EXAM BY PATHOLOGIST: CPT | Mod: 26,,, | Performed by: PATHOLOGY

## 2023-11-08 PROCEDURE — 99499 UNLISTED E&M SERVICE: CPT | Mod: S$GLB,,, | Performed by: OBSTETRICS & GYNECOLOGY

## 2023-11-08 PROCEDURE — 71046 X-RAY EXAM CHEST 2 VIEWS: CPT | Mod: TC,FY

## 2023-11-08 PROCEDURE — 88305 TISSUE EXAM BY PATHOLOGIST: CPT | Performed by: PATHOLOGY

## 2023-11-08 PROCEDURE — 99499 NO LOS: ICD-10-PCS | Mod: S$GLB,,, | Performed by: OBSTETRICS & GYNECOLOGY

## 2023-11-08 PROCEDURE — 80053 COMPREHEN METABOLIC PANEL: CPT | Performed by: OBSTETRICS & GYNECOLOGY

## 2023-11-08 PROCEDURE — 99999 PR PBB SHADOW E&M-EST. PATIENT-LVL III: ICD-10-PCS | Mod: PBBFAC,,, | Performed by: OBSTETRICS & GYNECOLOGY

## 2023-11-08 PROCEDURE — 88305 TISSUE EXAM BY PATHOLOGIST: ICD-10-PCS | Mod: 26,,, | Performed by: PATHOLOGY

## 2023-11-08 PROCEDURE — 93010 ELECTROCARDIOGRAM REPORT: CPT | Mod: ,,, | Performed by: INTERNAL MEDICINE

## 2023-11-08 PROCEDURE — 58100 BIOPSY OF UTERUS LINING: CPT | Mod: S$GLB,,, | Performed by: OBSTETRICS & GYNECOLOGY

## 2023-11-08 PROCEDURE — 93010 EKG 12-LEAD: ICD-10-PCS | Mod: ,,, | Performed by: INTERNAL MEDICINE

## 2023-11-08 PROCEDURE — 81025 POCT URINE PREGNANCY: ICD-10-PCS | Mod: S$GLB,,, | Performed by: OBSTETRICS & GYNECOLOGY

## 2023-11-08 PROCEDURE — 81025 URINE PREGNANCY TEST: CPT | Mod: S$GLB,,, | Performed by: OBSTETRICS & GYNECOLOGY

## 2023-11-08 PROCEDURE — 71046 X-RAY EXAM CHEST 2 VIEWS: CPT | Mod: 26,,, | Performed by: RADIOLOGY

## 2023-11-08 PROCEDURE — 99999 PR PBB SHADOW E&M-EST. PATIENT-LVL III: CPT | Mod: PBBFAC,,, | Performed by: OBSTETRICS & GYNECOLOGY

## 2023-11-08 PROCEDURE — 71046 XR CHEST PA AND LATERAL PRE-OP: ICD-10-PCS | Mod: 26,,, | Performed by: RADIOLOGY

## 2023-11-08 PROCEDURE — 85025 COMPLETE CBC W/AUTO DIFF WBC: CPT | Performed by: OBSTETRICS & GYNECOLOGY

## 2023-11-08 PROCEDURE — 93005 ELECTROCARDIOGRAM TRACING: CPT

## 2023-11-08 RX ORDER — SODIUM CHLORIDE 9 MG/ML
INJECTION, SOLUTION INTRAVENOUS CONTINUOUS
Status: CANCELLED | OUTPATIENT
Start: 2023-11-08

## 2023-11-08 RX ORDER — MUPIROCIN 20 MG/G
OINTMENT TOPICAL
Status: CANCELLED | OUTPATIENT
Start: 2023-11-08

## 2023-11-08 RX ORDER — FAMOTIDINE 20 MG/1
20 TABLET, FILM COATED ORAL
Status: DISCONTINUED | OUTPATIENT
Start: 2023-11-08 | End: 2023-11-08 | Stop reason: CLARIF

## 2023-11-08 NOTE — PROCEDURES
"[facility]  Ambulatory Clinic   Obstetrics & Gynecology    Date:  2023    Procedure:  Endometrial Biopsy (CPT 60704)    UPT:  Negative    Indication:  Pre-op endometrial biopsy for hysterectomy for high grade cervical dysplasia    History:  Caty Zapata is a 38 y.o.  here for an pre-op endometrial biopsy for hysterectomy for high grade cervical dysplasia.    Consent:  We discussed the risks, benefits, alternatives, and possible complications to endometrial biopsy and all indicated procedures in detail.  All of her questions were answered to her satisfaction.  She voiced understanding and informed consent was obtained/signed in chart.    Vitals:  /68   Ht 5' 6" (1.676 m)   Wt 91.7 kg (202 lb 4.4 oz)   BMI 32.65 kg/m²     Physical Exam:  Abdomen soft, non-tender, no masses. External genitalia, vaginal wall and cervix without gross abnormality.  Bimanual exam reveals a small week size, non-tender, mid-plain uterus, without adnexal mass or tenderness.     Procedure Details:  A time out was performed to confirmed the correct patient and procedure.  A speculum was placed and the cervix was prepped with betadine.  A single tooth tenaculum was applied to the cervix for stabilization.   Uterus sounded to 7 cm.  A pipelle was used to sample the endometrium.  A moderate amount of tissue was obtained.  Sample was sent for pathologic examination.  Tenaculum removed and pressure applied with cotton ball and ring forceps until hemostasis achieved.  Patient tolerated the procedure well.  Sterile technique maintained.  Hemostasis noted.  VSSAF, pain scale 0/10 at end of procedure.    Impression:  Pre-op endometrial biopsy for hysterectomy for high grade cervical dysplasia    Plan:      Endometrial sample sent for pathology.    Clinical findings and expectations discussed.    Usual post procedure warnings and aftercare instructions reviewed.  Pt was advised to call for any fever or for " prolonged or severe pain or bleeding.  She was advised to use OTC analgesics as needed for mild to moderate pain.    Discussed mgt options for high grader cervical dysplasia.  Pt is requesting total laparoscopic hysterectomy. Risks, benefits, and alternatives to TLH discussed.  All of her questions were answered to her satisfaction, pt voiced understanding.       Clive Hernandez MD

## 2023-11-08 NOTE — ANESTHESIA PREPROCEDURE EVALUATION
11/08/2023  Caty Zapata is a 38 y.o., female   To undergo Procedure(s) (LRB):  HYSTERECTOMY, TOTAL, LAPAROSCOPIC (N/A)     Denies CP/SOB/GERD/MI/CVA/URI symptoms.  METS > 4  NPO > 8    Past Medical History:  Past Medical History:   Diagnosis Date    Abnormal Pap smear     Cervical cancer     Vaginal delivery     x1       Past Surgical History:  Past Surgical History:   Procedure Laterality Date    KNEE SURGERY  2015    patellar surgery. dr. george HARTLEY   2002    of cervix       Social History:  Social History     Socioeconomic History    Marital status: Single   Occupational History     Comment: works for [employer].    Tobacco Use    Smoking status: Every Day     Current packs/day: 0.10     Average packs/day: 0.1 packs/day for 10.0 years (1.0 ttl pk-yrs)     Types: Cigarettes    Smokeless tobacco: Never    Tobacco comments:     down to 2 cigarettes   Substance and Sexual Activity    Alcohol use: Yes     Comment: occassional    Drug use: No    Sexual activity: Yes     Partners: Male     Birth control/protection: None     Social Determinants of Health     Stress: No Stress Concern Present (8/20/2020)    [employer] - Occupational Stress Questionnaire     Feeling of Stress : Not at all       Medications:  No current facility-administered medications on file prior to encounter.     Current Outpatient Medications on File Prior to Encounter   Medication Sig Dispense Refill    atorvastatin (LIPITOR) 20 MG tablet Take 1 tablet (20 mg total) by mouth once daily. (Patient not taking: Reported on 11/13/2023) 90 tablet 3       Allergies:  Review of patient's allergies indicates:  No Known Allergies    Active Problems:  Patient Active Problem List   Diagnosis    History of patellar fracture    Amenorrhea    Mixed hyperlipidemia    Chest pain, atypical    WILLS (dyspnea on exertion)     Diagnostic Studies:   Latest Reference Range & Units 11/08/23 15:35   WBC 3.90 - 12.70 K/uL  6.68   RBC 4.00 - 5.40 M/uL 5.13   Hemoglobin 12.0 - 16.0 g/dL 9.6 (L)   Hematocrit 37.0 - 48.5 % 32.7 (L)   MCV 82 - 98 fL 64 (L)   MCH 27.0 - 31.0 pg 18.7 (L)   MCHC 32.0 - 36.0 g/dL 29.4 (L)   RDW 11.5 - 14.5 % 20.0 (H)   Platelet Count 150 - 450 K/uL 463 (H)   MPV 9.2 - 12.9 fL 9.2   Gran % 38.0 - 73.0 % 39.3   Lymph % 18.0 - 48.0 % 42.7      Latest Reference Range & Units 11/08/23 15:35   Sodium 136 - 145 mmol/L 135 (L)   Potassium 3.5 - 5.1 mmol/L 5.1   Chloride 95 - 110 mmol/L 106   CO2 23 - 29 mmol/L 21 (L)   Anion Gap 8 - 16 mmol/L 8   BUN 6 - 20 mg/dL 11   Creatinine 0.5 - 1.4 mg/dL 0.9   eGFR >60 mL/min/1.73 m^2 >60     EKG (11/8/23):  NSR    TTE (11/16/23):    Left Ventricle: The left ventricle is normal in size. Normal wall thickness. Normal wall motion. There is normal systolic function with a visually estimated ejection fraction of 55 - 60%. There is normal diastolic function.    Right Ventricle: Normal right ventricular cavity size. Systolic function is normal.    24 Hour Vitals:  Temp:  [36.9 °C (98.4 °F)] 36.9 °C (98.4 °F)  Pulse:  [69] 69  Resp:  [18] 18  SpO2:  [99 %] 99 %  BP: (124)/(73) 124/73   See Nursing Charting For Additional Vitals    Pre-op Assessment    I have reviewed the Patient Summary Reports.     I have reviewed the Nursing Notes. I have reviewed the NPO Status.   I have reviewed the Medications.     Review of Systems  Anesthesia Hx:  No problems with previous Anesthesia             Denies Family Hx of Anesthesia complications.    Denies Personal Hx of Anesthesia complications.                    Social:  Smoker, Alcohol Use       Hematology/Oncology:    Oncology Normal    -- Anemia:                                  EENT/Dental:  EENT/Dental Normal           Cardiovascular:  Exercise tolerance: good    Denies Hypertension.           hyperlipidemia   ECG has been reviewed.  Functional Capacity good / => 4 METS                         Pulmonary:  Pulmonary Normal                        Renal/:  Renal/ Normal                 Hepatic/GI:  Hepatic/GI Normal                 Musculoskeletal:  Musculoskeletal Normal                OB/GYN/PEDS:  Cervical CA           Neurological:  Neurology Normal                                      Endocrine:        Obesity / BMI > 30  Dermatological:  Skin Normal    Psych:  Psychiatric Normal                    Physical Exam  General: Well nourished and Cooperative    Airway:  Mallampati: II   Mouth Opening: Normal  TM Distance: Normal    Dental:  Intact    Chest/Lungs:  Clear to auscultation, Normal Respiratory Rate    Heart:  Rate: Normal  Rhythm: Regular Rhythm        Anesthesia Plan  Type of Anesthesia, risks & benefits discussed:    Anesthesia Type: Gen ETT  Intra-op Monitoring Plan: Standard ASA Monitors  Post Op Pain Control Plan: multimodal analgesia and IV/PO Opioids PRN  Induction:  IV  Airway Plan: Direct and Video, Post-Induction  Informed Consent: Informed consent signed with the Patient and all parties understand the risks and agree with anesthesia plan.  All questions answered. Patient consented to blood products? Yes  ASA Score: 2  Anesthesia Plan Notes:   GA with OETT  Standard ASA monitors  Recovery in PACU  PONV: 2    Ready For Surgery From Anesthesia Perspective.     .

## 2023-11-08 NOTE — DISCHARGE INSTRUCTIONS
YOUR PROCEDURE WILL BE AT [facility] at [address] [address]                  Before 7 AM, enter through the Emergency Entrance..   After 7 AM enter through the Main Entrance.                 Report to the Same Day Surgery Registration Desk in the hallway.(Just beside the Same Day Surgery Unit)      Your procedure  is scheduled for ___11/17/2023_______.    Call [phone] between 2pm and 5pm on _11/16/2023_____to find out your arrival time for the day of surgery.    You may have two visitors.  No children under 12 years old.     You will be going to the Same Day Surgery Unit on the 2nd floor of the hospital.    Important instructions:  Do not eat anything after midnight.  You may have plain water, non carbonated.  You may also have Gatorade or Powerade after midnight.    Stop all fluids 2 hours before your surgery.    It is okay to brush your teeth.  Do not have gum, candy or mints.    SEE MEDICATION SHEET.   TAKE MEDICATIONS AS DIRECTED WITH SIPS OF WATER.    STOP taking Aspirin, Ibuprofen,  Advil, Motrin, Mobic(meloxicam), Aleve (naproxen), Fish oil, and Vitamin E for at least 7 days before your surgery.     You may take Tylenol if needed which is not a blood thinner.    Please shower the night before and the morning of your surgery.      Use Chlorhexidine soap as instructed by your pre op nurse.   Please place clean linens on your bed the night before surgery. Please wear fresh clean clothing after each shower.    No shaving of procedural area at least 4-5 days before surgery due to increased risk of skin irritation and/or possible infection.    Contact lenses and removable denture work may not be worn during your procedure.    You may wear deodorant only. If you are having breast surgery, do not wear deodorant on the operative side.    Do not wear powder, body lotion, perfume/cologne or make-up.    Do not wear any jewelry or have any metal on your body.    You  will be asked to remove any dentures or partials for the procedure.    If you are going home on the same day of surgery, you must arrange for a family member or a friend to drive you home.  Public transportation is prohibited.  You will not be able to drive home if you were given anesthesia or sedation.    Patients who want to have their Post-op prescriptions filled from our in-house [facility], bring a Credit/Debit Card or cash with you. A co-pay may be required.  The pharmacy closes at 5:30 pm.    Wear loose fitting clothes allowing for bandages.    Please leave money and valuables home.      You may bring your cell phone.    Call the doctor if fever or illness should occur before your surgery.    Call [phone] to contact us here if needed.                            CLOTHES ON DAY OF SURGERY    SHOULDER surgery:  you must have a very oversized shirt.  Very, Very large.  You will probably have a large sling on with your arm strapped to your chest.  You will not be able to put the arm of the operated shoulder into a sleeve.  You can put the arm of the un-operated shoulder into the sleeve, but the shirt will need to be draped over the operated shoulder.       ARM or HAND surgery:  make sure that your sleeves are large and loose enough to pass over large dressings or cast.      BREAST or UNDERARM surgery:  wear a loose, button down shirt so that you can dress without raising your arms over your head.    ABDOMINAL surgery:  wear loose, comfortable clothing.  Nothing tight around the abdomen.  NO JEANS    PENIS or SCROTAL surgery:  loose comfortable clothing.  Large sweat pants, pajama pants or a robe.  ABSOLUTELY NO JEANS      LEG or FOOT surgery:  wear large loose pants that are able to pass over any large dressings or casts.  You could also wear loose shorts or a skirt.

## 2023-11-08 NOTE — PROGRESS NOTES
Pt here for pre-op for total laparoscopic hysterectomy secondary high grade cervical dysplasia.  Please see pre-op H&P for complete details.    Clive Hernandez MD

## 2023-11-09 ENCOUNTER — TELEPHONE (OUTPATIENT)
Dept: CARDIOLOGY | Facility: CLINIC | Age: 38
End: 2023-11-09
Payer: COMMERCIAL

## 2023-11-09 NOTE — TELEPHONE ENCOUNTER
Spoke with patient and informed we are at [place] tomorrow for clinic and advised we are fully booked. Patient informed. haydee

## 2023-11-09 NOTE — H&P
[facility]  History & Physical  Obstetrics & Gynecology    Visit Date:  2023    Chief Complaint:  Pre-op for total laparoscopic hysterectomy     History of Present Illness:      Caty Zapata is a 38 y.o.  here for pre-op for a total laparoscopic hysterectomy.    Pt is requesting definitive treatment with hysterectomy due to high grader cervical dysplasia with h/o LEEP.    Pt is resolute in her decision to proceed with surgery and has declined further conservative therapies.      Otherwise, pt is in her usual state of health.    Past Medical History:      Diagnosis    Abnormal Pap smear    Cervical cancer    Vaginal delivery    x1     Past Surgical History:      Procedure Laterality Date    KNEE SURGERY      patellar surgery. dr. george HARTLEY   2002    of cervix     Medications:     Current Outpatient Medications on File Prior to Visit   Medication Sig Dispense Refill    atorvastatin (LIPITOR) 20 MG tablet Take 1 tablet (20 mg total) by mouth once daily. 90 tablet 3     Allergies:     NKDA       Social History:      Denies tobacco, alcohol abuse or illicit drug use  Denies domestic abuse     Family History:       Problem Relation    Diabetes Mother    Hypertension Mother    Bell's palsy Mother    Stroke Paternal Grandfather    Cervical cancer Sister    Breast cancer Maternal Grandmother    Breast cancer Maternal Aunt    Colon cancer Neg Hx    Ovarian cancer Neg Hx     Review of Systems:      GENERAL:  No fever, fatigue, excessive weight gain or loss  HEENT:  No head injury, headaches, hearing changes, visual disturbance  RESPIRATORY:  No cough, shortness of breath  CARDIOVASCULAR:  No chest pain, heart palpitations, leg swelling  GASTROINTESTINAL:  No nausea, vomiting, constipation, diarrhea, abd pain, rectal bleeding   GENITOURINARY:  See HPI.  HEMATOLOGIC:  No easy bruisability or bleeding   LYMPHATICS:  No enlarged nodes  MUSCULOSKELETAL:  No joint pain or swelling  SKIN:   "No rash, lesions, jaundice  NEUROLOGIC:  No dizziness, weakness, syncope  PSYCHIATRIC:  No depression, anxiety or mood swings     Physical Exam:     /68   Ht 5' 6" (1.676 m)   Wt 91.7 kg (202 lb 4.4 oz)   BMI 32.65 kg/m²      GENERAL: NAD, well-nourished  HEENT: NCAT, moist mucus membranes, anicteric, neck supple  LUNGS: CTA-B  HEART: RRR  ABDOMEN: Soft, non-tender. Normoactive BS. No obvious organomegaly.  EXT: Symmetric w/o cramping, claudication, or edema. +2 distal pulses.  SKIN: No rashes or bruising  NEURO: Grossly intact bilaterally  PSYCH: Mood and affect appropriate    GENITOURINARY:  VULVAR:  Female external genitalia w/o any obvious lesions. Female hair distribution. Adequate perineal body, intact. Normal urethral meatus. No gross lymphadenopathy.   VAGINA:  Normal vaginal mucosa. Good support. No obvious lesion. No discharge.  CERVIX:  No cervical motion tenderness, discharge, or obvious lesions.   UTERUS:  Small, non-tender, normal contour  ADNEXA:  No masses, non-tender    RECTAL:  Deferred. No obvious external lesions    Labs/studies:    Lab Results   Component Value Date    WBC 6.68 11/08/2023    HGB 9.6 (L) 11/08/2023    HCT 32.7 (L) 11/08/2023    MCV 64 (L) 11/08/2023     (H) 11/08/2023     BMP  Lab Results   Component Value Date     (L) 11/08/2023    K 5.1 11/08/2023     11/08/2023    CO2 21 (L) 11/08/2023    BUN 11 11/08/2023    CREATININE 0.9 11/08/2023    CALCIUM 9.4 11/08/2023    ANIONGAP 8 11/08/2023    ESTGFRAFRICA >60 03/09/2022    EGFRNONAA >60 03/09/2022     Lab Results   Component Value Date    ALT 13 11/08/2023    AST 16 11/08/2023    ALKPHOS 76 11/08/2023    BILITOT 0.2 11/08/2023     Colposcopy 9/15/23:    Final Pathologic Diagnosis RELIAPATH DIAGNOSIS:   A.   CERVIX, 12 O'CLOCK, BIOPSY:   - Ectocervical and endocervical mucosa showing mild to focal moderate squamous dysplasia (HGSIL/JOSE 1-2), see comment.     B.  CERVIX, 5 O'CLOCK, BIOPSY:   - Ectocervical " and endocervical mucosa showing mild to moderate squamous dysplasia and HPV effect (HGSIL/JOSE 1-2), see comment.     C.   CERVIX, 7 O'CLOCK, BIOPSY:   - Ectocervical and endocervical mucosa showing mild to moderate squamous dysplasia and HPV effect (HGSIL/JOSE 1-2), see comment.     D.   ENDOCERVIX, CURETTAGE:   - Small fragment of squamous mucosa showing focal atypia consistent with mild squamous dysplasia (LGSIL/JOSE 1) in a background of abundant mucin, acute inflammation and superficial strips of benign endocervical mucosa.      Assessment:     38 y.o.  with:    High grade cervical dysplasia   H/o LEEP    Plan:    We discussed her condition in great detail.  The proposed procedure will be a total laparoscopic hysterectomy, bilateral salpingectomy, and ovarian conservation (if the ovaries are not damaged or diseased) if feasible all of which as determined at the time of surgery.  Pt has declined medical management or other conservative therapies, and is resolute in her decision to proceed with surgery.        Pt was informed of the risks, benefits, and alternatives of total laparoscopic hysterectomy.  Risks included but were not limited to bleeding, infection, injury to surrounding organs or tissues, injury to bladder and/or urinary tract, injury to bowel and/or intestinal obstruction, damage to major blood vessels, hemorrhage requiring transfusion of blood products, ovarian failure requiring hormone administration, pulmonary embolism, fistula formation, urinary and/or fecal incontinence, sexual dysfunction/pain, chronic pain, hernia, failure of wound to heal, permanent/disfiguring scarring, and even death.  Pt was counseled extensively on the inability to become pregnant following a hysterectomy and expressed an understanding of this.  Pt was also counseled that a bilateral oophorectomy will result in surgical menopause for pre/perimenopausal women and the long term health consequences thereof.  Pt was  counseled in the possibility of laparotomy if extensive adhesions or bleeding were encountered.  Pt was counseled on the cancer risk associated with the use of a uterine morcellator.  Pt was counseled that hysterectomy and/or oophorectomy may not result in the resolution of her pain symptoms.  Risk of leiomyosarcoma on post-op specimen discussed.  Pt was counseled on the risk of uterine and/or ovarian cancer diagnosed on post-op pathology which may require additional future surgery and/or treatment.  Risk of re-operative for future ovarian pathology discussed.  The risks, benefits, and alternatives to blood transfusion was also discussed.  Pt expressed an understanding of risks involved, all questions answered, and informed consent was obtained for the procedure and blood transfusion.    Surgery tentatively scheduled for 11/17/2023.    Pre-op instructions reviewed.      All questions answered, pt voiced understanding.        Clive Hernandez MD

## 2023-11-09 NOTE — PRE-PROCEDURE INSTRUCTIONS
Patient will need cardiac clearance due to hx of ongoing chest pain. Patient reports chest pain is not worse or better. She was seen by PCP in August and she did recommend that patient follow up with Cardiology.   Instructed patient if chest pain worsens to go to the ER.

## 2023-11-09 NOTE — TELEPHONE ENCOUNTER
----- Message from Francine Baehna sent at 11/9/2023  3:39 PM CST -----  Regarding: [id number]  Type: Patient Call Back    Who called:self     What is the request in detail: pt states she will like a call back from the nurse in regards to seeing if her appt can be tomorrow along with her ultrasound, she states she does not have transportation     Can the clinic reply by [facility]?no     Would the patient rather a call back or a response via [facility]? Call back     Best call back number:[phone]      Additional Information:         PATIENT RESTING IN BED WITH FAMILY AT BEDSIDE AND NO S/S OF DISTRESS. SET UP
ER CONTACT INFO AND SECURITY CALL IN CODE. PATIENT DENIES OTHER NEEDS AT THIS
TIME. BED IN LOWEST POSITION AND CALLLIGHT WITHIN REACH. ENCOURAGED THE
PATIENT TO CALL IF SHE HAS NEEDS. WILL CONTINUE TO MONITOR.

## 2023-11-13 ENCOUNTER — HOSPITAL ENCOUNTER (OUTPATIENT)
Dept: RADIOLOGY | Facility: HOSPITAL | Age: 38
Discharge: HOME | End: 2023-11-13
Attending: OBSTETRICS & GYNECOLOGY
Payer: COMMERCIAL

## 2023-11-13 ENCOUNTER — OFFICE VISIT (OUTPATIENT)
Dept: CARDIOLOGY | Facility: CLINIC | Age: 38
End: 2023-11-13
Payer: COMMERCIAL

## 2023-11-13 ENCOUNTER — PATIENT MESSAGE (OUTPATIENT)
Dept: OBSTETRICS AND GYNECOLOGY | Facility: CLINIC | Age: 38
End: 2023-11-13
Payer: COMMERCIAL

## 2023-11-13 VITALS
HEART RATE: 92 BPM | OXYGEN SATURATION: 100 % | HEIGHT: 66 IN | WEIGHT: 204.13 LBS | SYSTOLIC BLOOD PRESSURE: 122 MMHG | DIASTOLIC BLOOD PRESSURE: 76 MMHG | BODY MASS INDEX: 32.81 KG/M2

## 2023-11-13 DIAGNOSIS — R07.89 CHEST PAIN, ATYPICAL: ICD-10-CM

## 2023-11-13 DIAGNOSIS — R06.09 DOE (DYSPNEA ON EXERTION): ICD-10-CM

## 2023-11-13 DIAGNOSIS — R87.613 HGSIL (HIGH GRADE SQUAMOUS INTRAEPITHELIAL LESION) ON PAP SMEAR OF CERVIX: ICD-10-CM

## 2023-11-13 DIAGNOSIS — E78.2 MIXED HYPERLIPIDEMIA: Primary | ICD-10-CM

## 2023-11-13 DIAGNOSIS — R10.2 PELVIC CRAMPING: ICD-10-CM

## 2023-11-13 PROCEDURE — 76856 US EXAM PELVIC COMPLETE: CPT | Mod: 26,,, | Performed by: RADIOLOGY

## 2023-11-13 PROCEDURE — 3078F PR MOST RECENT DIASTOLIC BLOOD PRESSURE < 80 MM HG: ICD-10-PCS | Mod: CPTII,S$GLB,, | Performed by: INTERNAL MEDICINE

## 2023-11-13 PROCEDURE — 3074F SYST BP LT 130 MM HG: CPT | Mod: CPTII,S$GLB,, | Performed by: INTERNAL MEDICINE

## 2023-11-13 PROCEDURE — 99999 PR PBB SHADOW E&M-EST. PATIENT-LVL III: CPT | Mod: PBBFAC,,, | Performed by: INTERNAL MEDICINE

## 2023-11-13 PROCEDURE — 3078F DIAST BP <80 MM HG: CPT | Mod: CPTII,S$GLB,, | Performed by: INTERNAL MEDICINE

## 2023-11-13 PROCEDURE — 3008F BODY MASS INDEX DOCD: CPT | Mod: CPTII,S$GLB,, | Performed by: INTERNAL MEDICINE

## 2023-11-13 PROCEDURE — 99204 OFFICE O/P NEW MOD 45 MIN: CPT | Mod: S$GLB,,, | Performed by: INTERNAL MEDICINE

## 2023-11-13 PROCEDURE — 76830 US PELVIS COMP WITH TRANSVAG NON-OB (XPD): ICD-10-PCS | Mod: 26,,, | Performed by: RADIOLOGY

## 2023-11-13 PROCEDURE — 76856 US PELVIS COMP WITH TRANSVAG NON-OB (XPD): ICD-10-PCS | Mod: 26,,, | Performed by: RADIOLOGY

## 2023-11-13 PROCEDURE — 76830 TRANSVAGINAL US NON-OB: CPT | Mod: TC

## 2023-11-13 PROCEDURE — 3008F PR BODY MASS INDEX (BMI) DOCUMENTED: ICD-10-PCS | Mod: CPTII,S$GLB,, | Performed by: INTERNAL MEDICINE

## 2023-11-13 PROCEDURE — 99204 PR OFFICE/OUTPT VISIT, NEW, LEVL IV, 45-59 MIN: ICD-10-PCS | Mod: S$GLB,,, | Performed by: INTERNAL MEDICINE

## 2023-11-13 PROCEDURE — 76830 TRANSVAGINAL US NON-OB: CPT | Mod: 26,,, | Performed by: RADIOLOGY

## 2023-11-13 PROCEDURE — 1159F MED LIST DOCD IN RCRD: CPT | Mod: CPTII,S$GLB,, | Performed by: INTERNAL MEDICINE

## 2023-11-13 PROCEDURE — 99999 PR PBB SHADOW E&M-EST. PATIENT-LVL III: ICD-10-PCS | Mod: PBBFAC,,, | Performed by: INTERNAL MEDICINE

## 2023-11-13 PROCEDURE — 1159F PR MEDICATION LIST DOCUMENTED IN MEDICAL RECORD: ICD-10-PCS | Mod: CPTII,S$GLB,, | Performed by: INTERNAL MEDICINE

## 2023-11-13 PROCEDURE — 3074F PR MOST RECENT SYSTOLIC BLOOD PRESSURE < 130 MM HG: ICD-10-PCS | Mod: CPTII,S$GLB,, | Performed by: INTERNAL MEDICINE

## 2023-11-13 NOTE — PROGRESS NOTES
Subjective:   Patient ID:  Caty Zapata is a 38 y.o. female who presents for evaluation of Pre-op Exam (Hysterctomy on 2023) and Chest Pain      HPI    Referred by Pre-op for clearance prior to hysterectomy  Cayt Zapata is a 38 y.o.  here for pre-op for a total laparoscopic hysterectomy.     Pt is requesting definitive treatment with hysterectomy due to high grader cervical dysplasia with h/o LEEP.     Pt is resolute in her decision to proceed with surgery and has declined further conservative therapies.       Otherwise, pt is in her usual state of health.      EKG 23 NSR - ok    23 Reports intermittent sharp upper left CP - worse with activity. Also having WILLS if she walks fast  Denies prior CAD  Smokes 1/2 PPD  Negative family Hx premature CAD    Review of Systems   Constitutional: Negative for decreased appetite.   HENT:  Negative for ear discharge.    Eyes:  Negative for blurred vision.   Respiratory:  Negative for hemoptysis.    Endocrine: Negative for polyphagia.   Hematologic/Lymphatic: Negative for adenopathy.   Skin:  Negative for color change.   Musculoskeletal:  Negative for joint swelling.   Genitourinary:  Negative for bladder incontinence.   Neurological:  Negative for brief paralysis.   Psychiatric/Behavioral:  Negative for hallucinations.    Allergic/Immunologic: Negative for hives.       Objective:   Physical Exam  Constitutional:       Appearance: She is well-developed.   HENT:      Head: Normocephalic and atraumatic.   Eyes:      Conjunctiva/sclera: Conjunctivae normal.      Pupils: Pupils are equal, round, and reactive to light.   Cardiovascular:      Rate and Rhythm: Normal rate.      Pulses: Intact distal pulses.      Heart sounds: Normal heart sounds.   Pulmonary:      Effort: Pulmonary effort is normal.      Breath sounds: Normal breath sounds.   Abdominal:      General: Bowel sounds are normal.      Palpations: Abdomen is soft.   Musculoskeletal:         General: Normal  range of motion.      Cervical back: Normal range of motion and neck supple.   Skin:     General: Skin is warm and dry.   Neurological:      Mental Status: She is alert and oriented to person, place, and time.         Assessment:      1. Mixed hyperlipidemia    2. Chest pain, atypical    3. WILLS (dyspnea on exertion)        Plan:     Echo abd treadmill stress test for CP and WILLS - will clear for hysterectomy if ok

## 2023-11-14 LAB
FINAL PATHOLOGIC DIAGNOSIS: NORMAL
Lab: NORMAL

## 2023-11-16 ENCOUNTER — HOSPITAL ENCOUNTER (OUTPATIENT)
Dept: CARDIOLOGY | Facility: HOSPITAL | Age: 38
Discharge: HOME | End: 2023-11-16
Attending: INTERNAL MEDICINE
Payer: COMMERCIAL

## 2023-11-16 ENCOUNTER — TELEPHONE (OUTPATIENT)
Dept: SURGERY | Facility: HOSPITAL | Age: 38
End: 2023-11-16
Payer: COMMERCIAL

## 2023-11-16 VITALS
WEIGHT: 204 LBS | HEIGHT: 66 IN | BODY MASS INDEX: 32.78 KG/M2 | HEIGHT: 66 IN | BODY MASS INDEX: 32.78 KG/M2 | WEIGHT: 204 LBS

## 2023-11-16 DIAGNOSIS — R07.89 CHEST PAIN, ATYPICAL: ICD-10-CM

## 2023-11-16 DIAGNOSIS — E78.2 MIXED HYPERLIPIDEMIA: ICD-10-CM

## 2023-11-16 DIAGNOSIS — R06.09 DOE (DYSPNEA ON EXERTION): ICD-10-CM

## 2023-11-16 LAB
ASCENDING AORTA: 2.49 CM
AV INDEX (PROSTH): 0.86
AV MEAN GRADIENT: 3 MMHG
AV PEAK GRADIENT: 6 MMHG
AV VALVE AREA BY VELOCITY RATIO: 2.72 CM²
AV VALVE AREA: 2.78 CM²
AV VELOCITY RATIO: 0.84
BSA FOR ECHO PROCEDURE: 2.08 M2
CV ECHO LV RWT: 0.43 CM
CV STRESS BASE HR: 81 BPM
DIASTOLIC BLOOD PRESSURE: 81 MMHG
DOP CALC AO PEAK VEL: 1.19 M/S
DOP CALC AO VTI: 24.2 CM
DOP CALC LVOT AREA: 3.2 CM2
DOP CALC LVOT DIAMETER: 2.03 CM
DOP CALC LVOT PEAK VEL: 1 M/S
DOP CALC LVOT STROKE VOLUME: 67.29 CM3
DOP CALC MV VTI: 17.7 CM
DOP CALCLVOT PEAK VEL VTI: 20.8 CM
E WAVE DECELERATION TIME: 166.55 MSEC
E/A RATIO: 1.21
E/E' RATIO: 5.38 M/S
ECHO LV POSTERIOR WALL: 0.97 CM (ref 0.6–1.1)
FRACTIONAL SHORTENING: 38 % (ref 28–44)
INTERVENTRICULAR SEPTUM: 0.98 CM (ref 0.6–1.1)
IVC DIAMETER: 1.6 CM
LA MAJOR: 5.19 CM
LA MINOR: 4.72 CM
LA WIDTH: 3.9 CM
LEFT ATRIUM SIZE: 3.25 CM
LEFT ATRIUM VOLUME INDEX: 26.4 ML/M2
LEFT ATRIUM VOLUME: 53.26 CM3
LEFT INTERNAL DIMENSION IN SYSTOLE: 2.78 CM (ref 2.1–4)
LEFT VENTRICLE DIASTOLIC VOLUME INDEX: 44.78 ML/M2
LEFT VENTRICLE DIASTOLIC VOLUME: 90.46 ML
LEFT VENTRICLE MASS INDEX: 72 G/M2
LEFT VENTRICLE SYSTOLIC VOLUME INDEX: 14.4 ML/M2
LEFT VENTRICLE SYSTOLIC VOLUME: 29.11 ML
LEFT VENTRICULAR INTERNAL DIMENSION IN DIASTOLE: 4.46 CM (ref 3.5–6)
LEFT VENTRICULAR MASS: 145.92 G
LV LATERAL E/E' RATIO: 5 M/S
LV SEPTAL E/E' RATIO: 5.83 M/S
LVOT MG: 2.17 MMHG
LVOT MV: 0.69 CM/S
MV MEAN GRADIENT: 1 MMHG
MV PEAK A VEL: 0.58 M/S
MV PEAK E VEL: 0.7 M/S
MV PEAK GRADIENT: 3 MMHG
MV STENOSIS PRESSURE HALF TIME: 48.3 MS
MV VALVE AREA BY CONTINUITY EQUATION: 3.8 CM2
MV VALVE AREA P 1/2 METHOD: 4.55 CM2
OHS CV CPX 1 MINUTE RECOVERY HEART RATE: 125 BPM
OHS CV CPX 85 PERCENT MAX PREDICTED HEART RATE MALE: 155
OHS CV CPX ESTIMATED METS: 8
OHS CV CPX MAX PREDICTED HEART RATE: 182
OHS CV CPX PATIENT IS FEMALE: 1
OHS CV CPX PATIENT IS MALE: 0
OHS CV CPX PEAK DIASTOLIC BLOOD PRESSURE: 68 MMHG
OHS CV CPX PEAK HEAR RATE: 157 BPM
OHS CV CPX PEAK RATE PRESSURE PRODUCT: NORMAL
OHS CV CPX PEAK SYSTOLIC BLOOD PRESSURE: 164 MMHG
OHS CV CPX PERCENT MAX PREDICTED HEART RATE ACHIEVED: 91
OHS CV CPX RATE PRESSURE PRODUCT PRESENTING: 9639
PV PEAK GRADIENT: 2 MMHG
PV PEAK VELOCITY: 0.69 M/S
RA MAJOR: 4.33 CM
RA PRESSURE ESTIMATED: 8 MMHG
RA WIDTH: 2.2 CM
RIGHT VENTRICULAR END-DIASTOLIC DIMENSION: 2.8 CM
RV TISSUE DOPPLER FREE WALL SYSTOLIC VELOCITY 1 (APICAL 4 CHAMBER VIEW): 13.81 CM/S
SINUS: 2.84 CM
STJ: 2.09 CM
STRESS ECHO POST EXERCISE DUR MIN: 6 MINUTES
STRESS ECHO POST EXERCISE DUR SEC: 57 SECONDS
SYSTOLIC BLOOD PRESSURE: 119 MMHG
TDI LATERAL: 0.14 M/S
TDI SEPTAL: 0.12 M/S
TDI: 0.13 M/S
TRICUSPID ANNULAR PLANE SYSTOLIC EXCURSION: 2.08 CM
Z-SCORE OF LEFT VENTRICULAR DIMENSION IN END DIASTOLE: -2.88
Z-SCORE OF LEFT VENTRICULAR DIMENSION IN END SYSTOLE: -2.15

## 2023-11-16 PROCEDURE — 93017 CV STRESS TEST TRACING ONLY: CPT

## 2023-11-16 PROCEDURE — 93018 EXERCISE STRESS - EKG (CUPID ONLY): ICD-10-PCS | Mod: ,,, | Performed by: INTERNAL MEDICINE

## 2023-11-16 PROCEDURE — 93016 CV STRESS TEST SUPVJ ONLY: CPT | Mod: ,,, | Performed by: INTERNAL MEDICINE

## 2023-11-16 PROCEDURE — 93016 EXERCISE STRESS - EKG (CUPID ONLY): ICD-10-PCS | Mod: ,,, | Performed by: INTERNAL MEDICINE

## 2023-11-16 PROCEDURE — 93306 TTE W/DOPPLER COMPLETE: CPT

## 2023-11-16 PROCEDURE — 93018 CV STRESS TEST I&R ONLY: CPT | Mod: ,,, | Performed by: INTERNAL MEDICINE

## 2023-11-16 PROCEDURE — 93306 ECHO (CUPID ONLY): ICD-10-PCS | Mod: 26,,, | Performed by: INTERNAL MEDICINE

## 2023-11-16 PROCEDURE — 93306 TTE W/DOPPLER COMPLETE: CPT | Mod: 26,,, | Performed by: INTERNAL MEDICINE

## 2023-11-17 ENCOUNTER — ANESTHESIA (OUTPATIENT)
Dept: SURGERY | Facility: HOSPITAL | Age: 38
End: 2023-11-17
Payer: COMMERCIAL

## 2023-11-17 ENCOUNTER — HOSPITAL ENCOUNTER (OUTPATIENT)
Facility: HOSPITAL | Age: 38
Discharge: HOME | End: 2023-11-17
Attending: OBSTETRICS & GYNECOLOGY | Admitting: OBSTETRICS & GYNECOLOGY
Payer: COMMERCIAL

## 2023-11-17 ENCOUNTER — PATIENT MESSAGE (OUTPATIENT)
Dept: OBSTETRICS AND GYNECOLOGY | Facility: CLINIC | Age: 38
End: 2023-11-17
Payer: COMMERCIAL

## 2023-11-17 VITALS
DIASTOLIC BLOOD PRESSURE: 65 MMHG | HEART RATE: 68 BPM | RESPIRATION RATE: 20 BRPM | SYSTOLIC BLOOD PRESSURE: 104 MMHG | TEMPERATURE: 98 F | OXYGEN SATURATION: 99 %

## 2023-11-17 DIAGNOSIS — Z90.710 S/P LAPAROSCOPIC HYSTERECTOMY: Primary | ICD-10-CM

## 2023-11-17 DIAGNOSIS — D25.1 INTRAMURAL LEIOMYOMA OF UTERUS: ICD-10-CM

## 2023-11-17 DIAGNOSIS — Z87.81 HISTORY OF PATELLAR FRACTURE: ICD-10-CM

## 2023-11-17 DIAGNOSIS — R87.613 HIGH GRADE SQUAMOUS INTRAEPITHELIAL CERVICAL DYSPLASIA: ICD-10-CM

## 2023-11-17 DIAGNOSIS — R06.09 DOE (DYSPNEA ON EXERTION): ICD-10-CM

## 2023-11-17 DIAGNOSIS — R07.89 CHEST PAIN, ATYPICAL: ICD-10-CM

## 2023-11-17 DIAGNOSIS — E78.2 MIXED HYPERLIPIDEMIA: ICD-10-CM

## 2023-11-17 DIAGNOSIS — N91.2 AMENORRHEA: ICD-10-CM

## 2023-11-17 PROBLEM — D25.9 UTERINE FIBROID: Status: ACTIVE | Noted: 2023-11-17

## 2023-11-17 LAB
BASOPHILS # BLD AUTO: 0.01 K/UL (ref 0–0.2)
BASOPHILS NFR BLD: 0.1 % (ref 0–1.9)
DIFFERENTIAL METHOD: ABNORMAL
EOSINOPHIL # BLD AUTO: 0 K/UL (ref 0–0.5)
EOSINOPHIL NFR BLD: 0 % (ref 0–8)
ERYTHROCYTE [DISTWIDTH] IN BLOOD BY AUTOMATED COUNT: 20.3 % (ref 11.5–14.5)
HCT VFR BLD AUTO: 30.3 % (ref 37–48.5)
HGB BLD-MCNC: 8.8 G/DL (ref 12–16)
IMM GRANULOCYTES # BLD AUTO: 0.04 K/UL (ref 0–0.04)
IMM GRANULOCYTES NFR BLD AUTO: 0.4 % (ref 0–0.5)
LYMPHOCYTES # BLD AUTO: 0.5 K/UL (ref 1–4.8)
LYMPHOCYTES NFR BLD: 5.6 % (ref 18–48)
MCH RBC QN AUTO: 19.2 PG (ref 27–31)
MCHC RBC AUTO-ENTMCNC: 29 G/DL (ref 32–36)
MCV RBC AUTO: 66 FL (ref 82–98)
MONOCYTES # BLD AUTO: 0.1 K/UL (ref 0.3–1)
MONOCYTES NFR BLD: 1.1 % (ref 4–15)
NEUTROPHILS # BLD AUTO: 8.5 K/UL (ref 1.8–7.7)
NEUTROPHILS NFR BLD: 92.8 % (ref 38–73)
NRBC BLD-RTO: 0 /100 WBC
PLATELET # BLD AUTO: 431 K/UL (ref 150–450)
PMV BLD AUTO: 9.6 FL (ref 9.2–12.9)
POCT GLUCOSE: 95 MG/DL (ref 70–110)
RBC # BLD AUTO: 4.59 M/UL (ref 4–5.4)
WBC # BLD AUTO: 9.12 K/UL (ref 3.9–12.7)

## 2023-11-17 PROCEDURE — 85025 COMPLETE CBC W/AUTO DIFF WBC: CPT | Performed by: OBSTETRICS & GYNECOLOGY

## 2023-11-17 PROCEDURE — 71000039 HC RECOVERY, EACH ADD'L HOUR: Performed by: OBSTETRICS & GYNECOLOGY

## 2023-11-17 PROCEDURE — D9220A PRA ANESTHESIA: ICD-10-PCS | Mod: ANES,,, | Performed by: ANESTHESIOLOGY

## 2023-11-17 PROCEDURE — 36000710: Performed by: OBSTETRICS & GYNECOLOGY

## 2023-11-17 PROCEDURE — 71000033 HC RECOVERY, INTIAL HOUR: Performed by: OBSTETRICS & GYNECOLOGY

## 2023-11-17 PROCEDURE — D9220A PRA ANESTHESIA: Mod: ANES,,, | Performed by: ANESTHESIOLOGY

## 2023-11-17 PROCEDURE — 88307 TISSUE EXAM BY PATHOLOGIST: CPT | Mod: 26,,, | Performed by: PATHOLOGY

## 2023-11-17 PROCEDURE — 88307 PR  SURG PATH,LEVEL V: ICD-10-PCS | Mod: 26,,, | Performed by: PATHOLOGY

## 2023-11-17 PROCEDURE — 88342 IMHCHEM/IMCYTCHM 1ST ANTB: CPT | Mod: 26,,, | Performed by: PATHOLOGY

## 2023-11-17 PROCEDURE — D9220A PRA ANESTHESIA: Mod: CRNA,,, | Performed by: NURSE ANESTHETIST, CERTIFIED REGISTERED

## 2023-11-17 PROCEDURE — 58571 PR LAPAROSCOPY W TOT HYSTERECTUTERUS <=250 GRAM  W TUBE/OVARY: ICD-10-PCS | Mod: ,,, | Performed by: OBSTETRICS & GYNECOLOGY

## 2023-11-17 PROCEDURE — 88307 TISSUE EXAM BY PATHOLOGIST: CPT | Performed by: PATHOLOGY

## 2023-11-17 PROCEDURE — D9220A PRA ANESTHESIA: ICD-10-PCS | Mod: CRNA,,, | Performed by: NURSE ANESTHETIST, CERTIFIED REGISTERED

## 2023-11-17 PROCEDURE — 36415 COLL VENOUS BLD VENIPUNCTURE: CPT | Performed by: OBSTETRICS & GYNECOLOGY

## 2023-11-17 PROCEDURE — 25000003 PHARM REV CODE 250: Performed by: ANESTHESIOLOGY

## 2023-11-17 PROCEDURE — 63600175 PHARM REV CODE 636 W HCPCS: Performed by: ANESTHESIOLOGY

## 2023-11-17 PROCEDURE — 71000016 HC POSTOP RECOV ADDL HR: Performed by: OBSTETRICS & GYNECOLOGY

## 2023-11-17 PROCEDURE — 58571 PR LAPAROSCOPY W TOT HYSTERECTUTERUS <=250 GRAM  W TUBE/OVARY: ICD-10-PCS | Mod: 80,,, | Performed by: OBSTETRICS & GYNECOLOGY

## 2023-11-17 PROCEDURE — 63600175 PHARM REV CODE 636 W HCPCS: Performed by: OBSTETRICS & GYNECOLOGY

## 2023-11-17 PROCEDURE — 88342 IMHCHEM/IMCYTCHM 1ST ANTB: CPT | Performed by: PATHOLOGY

## 2023-11-17 PROCEDURE — 25000003 PHARM REV CODE 250: Performed by: NURSE ANESTHETIST, CERTIFIED REGISTERED

## 2023-11-17 PROCEDURE — 88342 CHG IMMUNOCYTOCHEMISTRY: ICD-10-PCS | Mod: 26,,, | Performed by: PATHOLOGY

## 2023-11-17 PROCEDURE — 58571 TLH W/T/O 250 G OR LESS: CPT | Mod: ,,, | Performed by: OBSTETRICS & GYNECOLOGY

## 2023-11-17 PROCEDURE — 36000711: Performed by: OBSTETRICS & GYNECOLOGY

## 2023-11-17 PROCEDURE — 58571 TLH W/T/O 250 G OR LESS: CPT | Mod: 80,,, | Performed by: OBSTETRICS & GYNECOLOGY

## 2023-11-17 PROCEDURE — 63600175 PHARM REV CODE 636 W HCPCS: Performed by: NURSE ANESTHETIST, CERTIFIED REGISTERED

## 2023-11-17 PROCEDURE — 82962 GLUCOSE BLOOD TEST: CPT | Performed by: OBSTETRICS & GYNECOLOGY

## 2023-11-17 PROCEDURE — C2628 CATHETER, OCCLUSION: HCPCS | Performed by: OBSTETRICS & GYNECOLOGY

## 2023-11-17 PROCEDURE — 37000009 HC ANESTHESIA EA ADD 15 MINS: Performed by: OBSTETRICS & GYNECOLOGY

## 2023-11-17 PROCEDURE — 25000003 PHARM REV CODE 250: Performed by: OBSTETRICS & GYNECOLOGY

## 2023-11-17 PROCEDURE — 71000015 HC POSTOP RECOV 1ST HR: Performed by: OBSTETRICS & GYNECOLOGY

## 2023-11-17 PROCEDURE — 27201423 OPTIME MED/SURG SUP & DEVICES STERILE SUPPLY: Performed by: OBSTETRICS & GYNECOLOGY

## 2023-11-17 PROCEDURE — 37000008 HC ANESTHESIA 1ST 15 MINUTES: Performed by: OBSTETRICS & GYNECOLOGY

## 2023-11-17 RX ORDER — DIPHENHYDRAMINE HCL 25 MG
25 CAPSULE ORAL EVERY 4 HOURS PRN
Status: DISCONTINUED | OUTPATIENT
Start: 2023-11-17 | End: 2023-11-17 | Stop reason: HOSPADM

## 2023-11-17 RX ORDER — ONDANSETRON 2 MG/ML
4 INJECTION INTRAMUSCULAR; INTRAVENOUS DAILY PRN
Status: DISCONTINUED | OUTPATIENT
Start: 2023-11-17 | End: 2023-11-17 | Stop reason: HOSPADM

## 2023-11-17 RX ORDER — FENTANYL CITRATE 50 UG/ML
INJECTION, SOLUTION INTRAMUSCULAR; INTRAVENOUS
Status: DISCONTINUED | OUTPATIENT
Start: 2023-11-17 | End: 2023-11-17

## 2023-11-17 RX ORDER — HALOPERIDOL 5 MG/ML
0.5 INJECTION INTRAMUSCULAR EVERY 10 MIN PRN
Status: DISCONTINUED | OUTPATIENT
Start: 2023-11-17 | End: 2023-11-17 | Stop reason: HOSPADM

## 2023-11-17 RX ORDER — SODIUM CHLORIDE 9 MG/ML
INJECTION, SOLUTION INTRAVENOUS CONTINUOUS
Status: DISCONTINUED | OUTPATIENT
Start: 2023-11-17 | End: 2023-11-17 | Stop reason: HOSPADM

## 2023-11-17 RX ORDER — OXYCODONE AND ACETAMINOPHEN 5; 325 MG/1; MG/1
1 TABLET ORAL EVERY 6 HOURS PRN
Qty: 20 TABLET | Refills: 0 | Status: SHIPPED | OUTPATIENT
Start: 2023-11-17

## 2023-11-17 RX ORDER — ACETAMINOPHEN 500 MG
1000 TABLET ORAL
Status: COMPLETED | OUTPATIENT
Start: 2023-11-17 | End: 2023-11-17

## 2023-11-17 RX ORDER — LIDOCAINE HYDROCHLORIDE 10 MG/ML
1 INJECTION, SOLUTION EPIDURAL; INFILTRATION; INTRACAUDAL; PERINEURAL ONCE
Status: DISCONTINUED | OUTPATIENT
Start: 2023-11-17 | End: 2023-11-17 | Stop reason: HOSPADM

## 2023-11-17 RX ORDER — SODIUM CHLORIDE, SODIUM LACTATE, POTASSIUM CHLORIDE, CALCIUM CHLORIDE 600; 310; 30; 20 MG/100ML; MG/100ML; MG/100ML; MG/100ML
INJECTION, SOLUTION INTRAVENOUS CONTINUOUS
Status: DISCONTINUED | OUTPATIENT
Start: 2023-11-17 | End: 2023-11-17 | Stop reason: HOSPADM

## 2023-11-17 RX ORDER — ROCURONIUM BROMIDE 10 MG/ML
INJECTION, SOLUTION INTRAVENOUS
Status: DISCONTINUED | OUTPATIENT
Start: 2023-11-17 | End: 2023-11-17

## 2023-11-17 RX ORDER — PROPOFOL 10 MG/ML
VIAL (ML) INTRAVENOUS
Status: DISCONTINUED | OUTPATIENT
Start: 2023-11-17 | End: 2023-11-17

## 2023-11-17 RX ORDER — ONDANSETRON 4 MG/1
8 TABLET, ORALLY DISINTEGRATING ORAL EVERY 8 HOURS PRN
Status: DISCONTINUED | OUTPATIENT
Start: 2023-11-17 | End: 2023-11-17 | Stop reason: HOSPADM

## 2023-11-17 RX ORDER — IBUPROFEN 600 MG/1
600 TABLET ORAL EVERY 6 HOURS PRN
Status: DISCONTINUED | OUTPATIENT
Start: 2023-11-17 | End: 2023-11-17 | Stop reason: HOSPADM

## 2023-11-17 RX ORDER — HYDROMORPHONE HYDROCHLORIDE 2 MG/ML
0.2 INJECTION, SOLUTION INTRAMUSCULAR; INTRAVENOUS; SUBCUTANEOUS EVERY 5 MIN PRN
Status: DISCONTINUED | OUTPATIENT
Start: 2023-11-17 | End: 2023-11-17 | Stop reason: HOSPADM

## 2023-11-17 RX ORDER — LIDOCAINE HYDROCHLORIDE 20 MG/ML
INJECTION INTRAVENOUS
Status: DISCONTINUED | OUTPATIENT
Start: 2023-11-17 | End: 2023-11-17

## 2023-11-17 RX ORDER — SIMETHICONE 80 MG
80 TABLET,CHEWABLE ORAL EVERY 4 HOURS PRN
Status: DISCONTINUED | OUTPATIENT
Start: 2023-11-17 | End: 2023-11-17 | Stop reason: HOSPADM

## 2023-11-17 RX ORDER — KETAMINE HYDROCHLORIDE 100 MG/ML
INJECTION, SOLUTION INTRAMUSCULAR; INTRAVENOUS
Status: DISCONTINUED | OUTPATIENT
Start: 2023-11-17 | End: 2023-11-17

## 2023-11-17 RX ORDER — KETOROLAC TROMETHAMINE 30 MG/ML
INJECTION, SOLUTION INTRAMUSCULAR; INTRAVENOUS
Status: DISCONTINUED | OUTPATIENT
Start: 2023-11-17 | End: 2023-11-17

## 2023-11-17 RX ORDER — OXYCODONE HYDROCHLORIDE 5 MG/1
5 TABLET ORAL EVERY 4 HOURS PRN
Status: DISCONTINUED | OUTPATIENT
Start: 2023-11-17 | End: 2023-11-17 | Stop reason: HOSPADM

## 2023-11-17 RX ORDER — PROCHLORPERAZINE EDISYLATE 5 MG/ML
5 INJECTION INTRAMUSCULAR; INTRAVENOUS EVERY 6 HOURS PRN
Status: DISCONTINUED | OUTPATIENT
Start: 2023-11-17 | End: 2023-11-17 | Stop reason: HOSPADM

## 2023-11-17 RX ORDER — ONDANSETRON 2 MG/ML
INJECTION INTRAMUSCULAR; INTRAVENOUS
Status: DISCONTINUED | OUTPATIENT
Start: 2023-11-17 | End: 2023-11-17

## 2023-11-17 RX ORDER — MUPIROCIN 20 MG/G
1 OINTMENT TOPICAL 2 TIMES DAILY
Status: DISCONTINUED | OUTPATIENT
Start: 2023-11-17 | End: 2023-11-17 | Stop reason: HOSPADM

## 2023-11-17 RX ORDER — PROCHLORPERAZINE EDISYLATE 5 MG/ML
INJECTION INTRAMUSCULAR; INTRAVENOUS
Status: DISCONTINUED | OUTPATIENT
Start: 2023-11-17 | End: 2023-11-17

## 2023-11-17 RX ORDER — CEFAZOLIN SODIUM 2 G/50ML
2 SOLUTION INTRAVENOUS
Status: COMPLETED | OUTPATIENT
Start: 2023-11-17 | End: 2023-11-17

## 2023-11-17 RX ORDER — IBUPROFEN 600 MG/1
600 TABLET ORAL EVERY 6 HOURS PRN
Qty: 40 TABLET | Refills: 0 | Status: SHIPPED | OUTPATIENT
Start: 2023-11-17 | End: 2023-12-20 | Stop reason: SDUPTHER

## 2023-11-17 RX ORDER — DEXAMETHASONE SODIUM PHOSPHATE 4 MG/ML
INJECTION, SOLUTION INTRA-ARTICULAR; INTRALESIONAL; INTRAMUSCULAR; INTRAVENOUS; SOFT TISSUE
Status: DISCONTINUED | OUTPATIENT
Start: 2023-11-17 | End: 2023-11-17

## 2023-11-17 RX ORDER — MIDAZOLAM HYDROCHLORIDE 1 MG/ML
INJECTION, SOLUTION INTRAMUSCULAR; INTRAVENOUS
Status: DISCONTINUED | OUTPATIENT
Start: 2023-11-17 | End: 2023-11-17

## 2023-11-17 RX ORDER — SODIUM CHLORIDE 0.9 % (FLUSH) 0.9 %
10 SYRINGE (ML) INJECTION
Status: DISCONTINUED | OUTPATIENT
Start: 2023-11-17 | End: 2023-11-17 | Stop reason: HOSPADM

## 2023-11-17 RX ORDER — MUPIROCIN 20 MG/G
OINTMENT TOPICAL
Status: DISCONTINUED | OUTPATIENT
Start: 2023-11-17 | End: 2023-11-17 | Stop reason: HOSPADM

## 2023-11-17 RX ADMIN — HYDROMORPHONE HYDROCHLORIDE 0.2 MG: 2 INJECTION INTRAMUSCULAR; INTRAVENOUS; SUBCUTANEOUS at 09:11

## 2023-11-17 RX ADMIN — GLYCOPYRROLATE 0.2 MG: 0.2 INJECTION, SOLUTION INTRAMUSCULAR; INTRAVITREAL at 07:11

## 2023-11-17 RX ADMIN — MIDAZOLAM HYDROCHLORIDE 2 MG: 1 INJECTION, SOLUTION INTRAMUSCULAR; INTRAVENOUS at 07:11

## 2023-11-17 RX ADMIN — DEXAMETHASONE SODIUM PHOSPHATE 8 MG: 4 INJECTION, SOLUTION INTRAMUSCULAR; INTRAVENOUS at 07:11

## 2023-11-17 RX ADMIN — ROCURONIUM BROMIDE 50 MG: 10 INJECTION, SOLUTION INTRAVENOUS at 07:11

## 2023-11-17 RX ADMIN — SUGAMMADEX 200 MG: 100 INJECTION, SOLUTION INTRAVENOUS at 08:11

## 2023-11-17 RX ADMIN — SODIUM CHLORIDE, SODIUM LACTATE, POTASSIUM CHLORIDE, AND CALCIUM CHLORIDE: .6; .31; .03; .02 INJECTION, SOLUTION INTRAVENOUS at 07:11

## 2023-11-17 RX ADMIN — ONDANSETRON 4 MG: 2 INJECTION, SOLUTION INTRAMUSCULAR; INTRAVENOUS at 08:11

## 2023-11-17 RX ADMIN — SODIUM CHLORIDE, SODIUM LACTATE, POTASSIUM CHLORIDE, AND CALCIUM CHLORIDE: .6; .31; .03; .02 INJECTION, SOLUTION INTRAVENOUS at 08:11

## 2023-11-17 RX ADMIN — PROPOFOL 180 MG: 10 INJECTION, EMULSION INTRAVENOUS at 07:11

## 2023-11-17 RX ADMIN — KETOROLAC TROMETHAMINE 30 MG: 30 INJECTION, SOLUTION INTRAMUSCULAR; INTRAVENOUS at 08:11

## 2023-11-17 RX ADMIN — MUPIROCIN: 20 OINTMENT TOPICAL at 06:11

## 2023-11-17 RX ADMIN — PROCHLORPERAZINE EDISYLATE 5 MG: 5 INJECTION INTRAMUSCULAR; INTRAVENOUS at 08:11

## 2023-11-17 RX ADMIN — KETAMINE HYDROCHLORIDE 50 MG: 100 INJECTION, SOLUTION, CONCENTRATE INTRAMUSCULAR; INTRAVENOUS at 07:11

## 2023-11-17 RX ADMIN — FENTANYL CITRATE 50 MCG: 50 INJECTION, SOLUTION INTRAMUSCULAR; INTRAVENOUS at 09:11

## 2023-11-17 RX ADMIN — FENTANYL CITRATE 100 MCG: 50 INJECTION, SOLUTION INTRAMUSCULAR; INTRAVENOUS at 07:11

## 2023-11-17 RX ADMIN — ACETAMINOPHEN 1000 MG: 500 TABLET ORAL at 06:11

## 2023-11-17 RX ADMIN — LIDOCAINE HYDROCHLORIDE 100 MG: 20 INJECTION, SOLUTION INTRAVENOUS at 07:11

## 2023-11-17 RX ADMIN — CEFAZOLIN SODIUM 2 G: 2 SOLUTION INTRAVENOUS at 07:11

## 2023-11-17 NOTE — PLAN OF CARE
"Received report, pt AAOX4, high [employer] in bed chewing jacquelyn crackers and watching television, two female visitors currently at bedside, pt denied any pain or discomfort, stated, "I'm waiting for two o'clock so I can go home and get in my own bed."  "

## 2023-11-17 NOTE — ANESTHESIA PROCEDURE NOTES
Intubation    Date/Time: 11/17/2023 7:18 AM    Performed by: Christine Hernandez CRNA  Authorized by: Zander Vanegas MD    Intubation:     Induction:  Intravenous    Intubated:  Postinduction    Mask Ventilation:  Easy mask    Attempts:  1    Attempted By:  CRNA    Method of Intubation:  Video laryngoscopy    Blade:  Sahu 3    Laryngeal View Grade: Grade I - full view of cords      Difficult Airway Encountered?: No      Complications:  None    Airway Device:  Oral endotracheal tube    Airway Device Size:  7.0    Style/Cuff Inflation:  Cuffed (inflated to minimal occlusive pressure)    Inflation Amount (mL):  5    Tube secured:  22    Placement Verified By:  Capnometry    Complicating Factors:  None    Findings Post-Intubation:  BS equal bilateral and atraumatic/condition of teeth unchanged

## 2023-11-17 NOTE — DISCHARGE INSTRUCTIONS
BATHING:                   You may shower after your dressing is removed, but no tub baths, hot tubs, saunas or swimming until you see the doctor.    DRESSING:  Remove your bandage in 24 hours. If there are skin tapes over the incision, leave them in place. They will start to come off in 5-7 days.    ACTIVITY LEVEL: If you have received sedation or an anesthetic, you may feel sleepy for   several hours. Rest until you are more awake. Gradually resume your normal activities  No heavy lifting or straining, nothing over 10 lbs., like a gallon of milk.  Pelvic rest- no sex, tampons or douching until follow up or instructed by doctor.  DIET:  You may resume your home diet. If nausea is present, increase your diet gradually with fluids and bland foods.    Medications:  Pain medication should be taken only if needed and as directed. If antibiotics are prescribed, the medication should be taken until completed. You will be given an updated list of you medications.  No driving, alcoholic beverages or signing legal documents for next 24 hours or while taking pain medication    CALL THE DOCTOR:   For any obvious bleeding (some dried blood over the incision is normal).     Redness, swelling, foul smell around incision or fever over 101.  Shortness of breath, Coughing up Bloody Sputum or Pains or Swelling in your calves.  Persistent pain or nausea not relieved by medication.  If vaginal bleeding is in excess of a normal period.  Problems urinating    If any unusual problems or difficulties occur contact your doctor. If you cannot contact your doctor but feel your signs and symptoms warrant a physicians attention return to the emergency room.             Fall Prevention  Millions of people fall every year and injure themselves. You may have had anesthesia or sedation which may increase your risk of falling. You may have health issues that put you at an increased risk of falling.     Here are ways to reduce your risk of  falling.    Make your home safe by keeping walkways clear of objects you may trip over.  Use non-slip pads under rugs. Do not use area rugs or small throw rugs.  Use non-slip mats in bathtubs and showers.  Install handrails and lights on staircases.  Do not walk in poorly lit areas.  Do not stand on chairs or wobbly ladders.  Use caution when reaching overhead or looking upward. This position can cause a loss of balance.  Be sure your shoes fit properly, have non-slip bottoms and are in good condition.   Wear shoes both inside and out. Avoid going barefoot or wearing slippers.  Be cautious when going up and down stairs, curbs, and when walking on uneven sidewalks.  If your balance is poor, consider using a cane or walker.  If your fall was related to alcohol use, stop or limit alcohol intake.   If your fall was related to use of sleeping medicines, talk to your doctor about this. You may need to reduce your dosage at bedtime if you awaken during the night to go to the bathroom.    To reduce the need for nighttime bathroom trips:  Avoid drinking fluids for several hours before going to bed  Empty your bladder before going to bed  Men can keep a urinal at the bedside  Stay as active as you can. Balance, flexibility, strength, and endurance all come from exercise. They all play a role in preventing falls. Ask your healthcare provider which types of activity are right for you.  Get your vision checked on a regular basis.  If you have pets, know where they are before you stand up or walk so you don't trip over them.  Use night lights.

## 2023-11-17 NOTE — PLAN OF CARE
"Care complete. Pt verbalized understanding of discharge instructions and readiness to go home. Pt asked about smoking, pt educated on the effects of smoking on healing and overall health, pt stated, "I'll wait 'til I get home." Rx delivered from [facility] pharmacy. Now waiting for transport home.   "

## 2023-11-17 NOTE — OP NOTE
[facility]   Operative Report   Obstetrics & Gynecology     Date of Surgery:  2023     Surgeon:  Clive Hernandez MD     Assistant:  Gregory Cano MD     Preoperative diagnosis:     High grade cervical dysplasia  H/o LEEP  Uterine fibroids     Postoperative diagnosis:     High grade cervical dysplasia  H/o LEEP  Uterine fibroids    Procedure performed:      Total laparoscopic hysterectomy  Bilateral salpingectomy    Anesthesia:  General      IV Fluids:  1000 mL of LR     Urine Output:  300 mL, clear at end of procedure       Estimated Blood Loss:  50 mL with no replacements     Specimens:   Uterus  Bilateral fallopian tubes     Findings:       Fibroid uterus  Right and left ovary normal appearing  Normal appearing bilateral tubes  Anterior and posterior cul-de-sac clear  Pelvic side walls clear without gross lymphadenopathy  Omentum, small and large bowel in operative field normal appearing  Liver edge with normal appearence    Complications:  None    Indications for the Procedure:      See H&P for complete details.  In brief, Caty Zapata is a 38 y.o.  with high grade cervical dysplasia requesting definitive surgical therapy with laparoscopic hysterectomy and ovarian conservation.  Pt declined further medical management or other conservative therapies.      Pt was informed of the risks, benefits, and alternatives of a laparoscopic hysterectomy.  Risks included but were not limited to bleeding, infection, injury to surrounding organs or tissues, injury to bladder and/or urinary tract, injury to bowel and/or intestinal obstruction, damage to major blood vessels, hemorrhage requiring transfusion of blood products, ovarian failure requiring hormone administration, pulmonary embolism, fistula formation, urinary and/or fecal incontinence, sexual dysfunction/pain, chronic pain, hernia, failure of wound to heal, permanent/disfiguring scarring, and even death.  Pt was counseled  extensively on the inability to become pregnant following a hysterectomy and expressed an understanding of this.  Pt was also counseled that a bilateral oophorectomy will result in surgical menopause for pre/perimenopausal women.  Pt counseled on the risks, benefits, and alternatives to ovarian conservation.  Pt was counseled in the possibility of laparotomy if extensive adhesions or bleeding were encountered.  Pt was counseled on the cancer risk associated with the use of a uterine morcellator.  Pt was counseled that hysterectomy and/or oophorectomy may not result in the resolution of her pain symptoms.  Pt was counseled on the risk of uterine and/or ovarian cancer diagnosed on post-op pathology which may require additional future surgery and/or treatment.  Risk of re-operative for future ovarian pathology discussed.  Pt  was also counseled on the risks, benefits, and alternatives to blood transfusion.  Pt expressed an understanding of risks involved, all questions answered, and informed consent was obtained for the procedure and blood transfusion.    Description of the Procedure:      Pt was taken to the operating room where a time out was performed to confirm the correct patient and correct procedure.  Preoperative prophylactic IV antibiotics was administered prior to the procedure.  Pt was then placed in the dorsal lithotomy position with legs supported in Jose stirrups and care was taken to pad all pressure points.  General anesthesia was obtained without difficulty.  A France hugger was placed to maintain control of core body temperature.  Pt was then prepped and draped in a normal sterile fashion.  A jarrett catheter was inserted.  SONI uterine manipulator was placed without difficulty.  Attention was turned to the abdomen for laparoscopy.  A small vertical incision was made infraumbilical.  The Veress needle was introduced into the peritoneum.  Placement of the needle was confirmed with normal saline drop test.   Pneumoperitoneum was then established with ~3 liters of carbon dioxide and the Veress needle removed.  A 11 mm trocar was inserted through the paraumbilical incision into the peritoneum without difficulty and pneumoperitoneum was then maintained using carbon dioxide.   Next, two additional small incisions where made for the secondary trocars, one in the right and left lower quadrant approximately 3 cm from iliac crest.  Two 5 mm ports where introduced under direct visualization.  Survey of the pelvis revealed the above findings.  Attention was then turned to the right side of uterus.  The uterine fundus was grasped with a single tooth tenaculum.  The pelvic courses of the ureters where identified to be well out of the surgical field.  The EnSeal was then used to coagulate and cut the round ligament followed by the utero-ovarian ligament in similar fashion.  The EnSeal was then used to coagulate and cut down the broad ligament to the uterine artery.  A bladder flap was then created and dissected across the uterus at the level of the lower uterine segment.  The bladder was pushed down.  The uterine arteries where then coagulate and cut using the EnSeal.  Attention was turned to the contralateral side of the uterus which was dissected in a similar fashion. Following bilateral ligation of the uterine vessels, the uterus appeared completely blanched.  The cardinal ligaments were identified, coagulated and cut using the EnSeal.  The uterus was then elevated using the uterine manipulator and the level of dissection was noted to be adequate for colpotomy.  The colpotomy incision was made using the Harmonic spatula and the incision was extended circumferentially.  Care was taken to avoid shortening the vaginal cuff.  The uterus was then retracted into the vagina with the uterine manipulator.  The vaginal cuff was closed laparoscopically using V-Loc absorbable suture on an endostitch in an continuous fashion.  The vaginal  cuff was supported by incorporating the bilateral uterosacral ligaments to the lateral apex of the vaginal cuff during closure.  A bilateral salpingectomy then was performed using Enseal and removed through the umbilical port.  Inspection of vaginal cuff showed good hemostasis with no obvious evidence of injury to bladder, ureters, or bowel.  The pelvic cavity was then throughly irrigated and again hemostasis was noted.  All instruments and ports where removed from abdomen under direct visualization with the laparoscope.  The pneumoperitoneum was evacuated and the paraumbilical port was removed.  Again, hemostasis was assured.  All fascial incisional closed with 3-0 vicryl.  All skin incisions were closed using 4-0 vicryl.  Pt tolerated the procedure well.  All instruments were removed from vagina.  Sponge, lap and needle counts were correct time two.  Pt was transferred to the PACU in stable condition.       Clive Hernandez MD    Surgical specimen:        Images:  1 - before TLH  2-3 - end of case

## 2023-11-17 NOTE — TRANSFER OF CARE
Anesthesia Transfer of Care Note    Patient: Caty Zapata    Procedure(s) Performed: Procedure(s) (LRB):  HYSTERECTOMY, TOTAL, LAPAROSCOPIC; BILATERAL SALPINGECTOMY (N/A)    Patient location: PACU    Anesthesia Type: general    Transport from OR: Transported from OR on 6-10 L/min O2 by face mask with adequate spontaneous ventilation    Post pain: adequate analgesia    Post assessment: no apparent anesthetic complications    Post vital signs: stable    Level of consciousness: responds to stimulation and sedated    Nausea/Vomiting: no nausea/vomiting    Complications: none    Transfer of care protocol was followed    Last vitals: Visit Vitals  BP (!) 97/53 (BP Location: Left arm, Patient Position: Lying)   Pulse 73   Temp 36.6 °C (97.9 °F) (Skin)   Resp (!) 28   LMP 11/14/2023 (Exact Date)   SpO2 100%   Breastfeeding No

## 2023-11-17 NOTE — DISCHARGE SUMMARY
[facility]    Obstetrics & Gynecology  Discharge Summary      Patient Name:  Caty Zapata  MRN:  [id number]  Admission Date:  2023  Discharge Date:  2023  Hospital Length of Stay:  0  Attending Physician:  Clive Hernandez MD  Discharging Physician:  Clive Hernandez MD  Primary Care Provider:  Gisella Sam MD  Date of Surgery:  2023    Admitting Diagnosis:       High grade cervical dysplasia  H/o LEEP  Uterine leiomyoma  Abnormal uterine bleeding  Anemia, iron deficiency      Discharge Diagnosis:    High grade cervical dysplasia  H/o LEEP  Uterine leiomyoma  Abnormal uterine bleeding  Anemia, iron deficiency    S/p total laparoscopic hysterectomy with bilateral salpingectomy    Procedure performed:      Total laparoscopic hysterectomy  Bilateral salpingectomy    Brief Hospital Course:      See H&P for complete details.  In brief, Caty Zapata is a 38 y.o.  with high grade cervical dysplasia requesting definitive surgical therapy with laparoscopic hysterectomy and ovarian conservation.  Pt declined further medical management or other conservative therapies.  Pt tolerated the surgery well without complication.  Please see operative report for further details.  Following the surgery, pt was transferred to the PACU in stable condition.  Pt had an uncomplicated post-operative course.  Prior to discharge, pt was without major complaints.  Pain was well-controlled.  Vital signs where physiologic and stable.  Physical exam showed no acute findings.  Pt had no active vaginal bleeding.  Surgical incisions was hemostatic.  Pt was ambulating, tolerating oral intake, and voiding without difficulty.  Pt had satisfied routine post-op discharge criteria and expressed the desire to go home.        Discharge Exam:      Temp:  [97.7 °F (36.5 °C)-98.4 °F (36.9 °C)] 97.7 °F (36.5 °C)  Pulse:  [57-81] 60  Resp:  [15-28] 16  SpO2:  [96 %-100 %] 99 %  BP: ()/(53-73) 112/60    /60    Pulse 60   Temp 97.7 °F (36.5 °C) (Oral)   Resp 16   LMP 11/14/2023 (Exact Date)   SpO2 99%   Breastfeeding No     GENERAL:  No acute distress. Alert and oriented x 3.   NECK:  Supple, FROM.  LUNGS:  Clear to auscultation bilaterally.   HEART:  Regular rate and rhythm with physiologic heart sounds.   ABDOMEN:  Soft, non-tender, no guarding, rigidity, or rebound.   INCISION:  Clean, dry, & intact.   EXT:  No cramping, claudication or edema.  Good skin turgor.  +2 distal pulses, symmetric.  Full range of motion.   NEURO:  Grossly intact bilaterally.   PSYCH:  Mood and affect appropriate.   PERINEUM:  Intact with no active bleeding.    Discharge Condition:  Stable      Discharge Disposition:  Home       Discharge Medications:     Motrin   Percocet    Discharge Activites:      Activities as tolerated with adequate rest  Pelvic rest for 6 weeks   No heavy lifting greater 10 lbs or vigorous activities   Showers only  Wound care instructions and precautions reviewed  Avoid driving and operating machinery while taking narcotics or other sedative medications.  Patient voiced understanding.    Discharge Diet:  Regular diet    Discharge Instructions:      Post-op care instructions and precautions, clinical/sugical findings, and hospital course reviewed with patient prior to discharge.  Pt was instructed to contact the clinic or emergency department for any concerns including but not limited to an increase in her vaginal bleeding, abdominal pain, foul vaginal discharge, weakness, decrease activity level, decrease appetite, difficulty with voiding or having bowel movements, wound breakdown, perineal pain or breakdown, fever >100.4, shortness of breath, chest pain, dizziness or feeling faint, pain or swelling in her extremities, headaches not relieved with rest and Tylenol, abnormal bleeding/bruising, or any other health concerns.  All of pt questions were answered to her satisfaction.  Pt voiced understanding.     Follow-up  Visit:  2 weeks for post-op visit, or sooner if any problems.       Clive Hernandez MD

## 2023-11-17 NOTE — ANESTHESIA POSTPROCEDURE EVALUATION
Anesthesia Post Evaluation    Patient: Caty Zapata    Procedure(s) Performed: Procedure(s) (LRB):  HYSTERECTOMY, TOTAL, LAPAROSCOPIC; BILATERAL SALPINGECTOMY (N/A)    Final Anesthesia Type: general      Patient location during evaluation: PACU  Patient participation: Yes- Able to Participate  Level of consciousness: awake and alert and oriented  Post-procedure vital signs: reviewed and stable  Pain management: adequate  Airway patency: patent    PONV status at discharge: No PONV  Anesthetic complications: no      Cardiovascular status: hemodynamically stable and blood pressure returned to baseline  Respiratory status: spontaneous ventilation, room air and unassisted  Hydration status: euvolemic  Follow-up not needed.          Vitals Value Taken Time   /63 11/17/23 1015   Temp 36.5 °C (97.7 °F) 11/17/23 1015   Pulse 64 11/17/23 1015   Resp 16 11/17/23 1015   SpO2 99 % 11/17/23 1015         Event Time   Out of Recovery 10:11:58         Pain/Richmond Score: Pain Rating Prior to Med Admin: 6 (11/17/2023  9:55 AM)  Pain Rating Post Med Admin: 4 (11/17/2023  9:55 AM)  Richmond Score: 10 (11/17/2023 10:15 AM)

## 2023-11-22 LAB
FINAL PATHOLOGIC DIAGNOSIS: NORMAL
GROSS: NORMAL
Lab: NORMAL

## 2023-12-01 ENCOUNTER — TELEPHONE (OUTPATIENT)
Dept: OBSTETRICS AND GYNECOLOGY | Facility: CLINIC | Age: 38
End: 2023-12-01
Payer: COMMERCIAL

## 2023-12-01 NOTE — TELEPHONE ENCOUNTER
Pt advised Deyvi is asking for pathology records for the reason pt had TLH performed. Phone number for medical records sent to pt via portal.     ----- Message from Danyelle Galvan sent at 12/1/2023  1:28 PM CST -----  Regarding: self  Who called:       What is the request in detail: pt calling to get status for her FMLA papers       Can the clinic reply by [facility]? No        Would the patient rather a call back or a response via [facility]? Call back       Best call back number:[phone]

## 2023-12-05 ENCOUNTER — PATIENT MESSAGE (OUTPATIENT)
Dept: OBSTETRICS AND GYNECOLOGY | Facility: CLINIC | Age: 38
End: 2023-12-05
Payer: COMMERCIAL

## 2023-12-08 ENCOUNTER — TELEPHONE (OUTPATIENT)
Dept: OBSTETRICS AND GYNECOLOGY | Facility: CLINIC | Age: 38
End: 2023-12-08
Payer: COMMERCIAL

## 2023-12-08 ENCOUNTER — PATIENT MESSAGE (OUTPATIENT)
Dept: OBSTETRICS AND GYNECOLOGY | Facility: CLINIC | Age: 38
End: 2023-12-08
Payer: COMMERCIAL

## 2023-12-08 NOTE — TELEPHONE ENCOUNTER
Called pt back and took care of paperwork update. jtn    ----- Message from Willycheyenne Gilliammedina Arellanoe sent at 12/8/2023  2:40 PM CST -----  Regarding: Caty  Type: Patient Callback     Who called: Caty     What is the request in detail: Pt stated that she would like to get her paperwork and she needs to get it today because today is the last day she has to turn it in. Please reach out to the patient.     Can the clinic reply by [facility]? Yes     Would the patient rather a call back or a response via [facility]? Callback     Best call back number: .[phone]      Additional Information:     09-Oct-2017

## 2023-12-13 ENCOUNTER — TELEPHONE (OUTPATIENT)
Dept: OBSTETRICS AND GYNECOLOGY | Facility: CLINIC | Age: 38
End: 2023-12-13
Payer: COMMERCIAL

## 2023-12-13 NOTE — TELEPHONE ENCOUNTER
Pt had hysterectomy on 11/17. Pt advised stitches still intact and hurt to touch. Appt scheduled.     ----- Message from Kaykay Arevalo sent at 12/13/2023  3:20 PM CST -----  Regarding: Self/ [phone]  Type: Patient Call Back    Who called:  Patient    What is the request in detail:  Patient had surgery 11/17.  Papers stated stitches would fall off in 5 to 7 days, patient still have the stiches and would like a call from staff.  Thank you    Would the patient rather a call back or a response via [facility]?   Call back    Best call back number:  [phone]          Thank you

## 2023-12-20 ENCOUNTER — OFFICE VISIT (OUTPATIENT)
Dept: OBSTETRICS AND GYNECOLOGY | Facility: CLINIC | Age: 38
End: 2023-12-20
Payer: COMMERCIAL

## 2023-12-20 VITALS
BODY MASS INDEX: 33.24 KG/M2 | SYSTOLIC BLOOD PRESSURE: 123 MMHG | HEIGHT: 66 IN | WEIGHT: 206.81 LBS | DIASTOLIC BLOOD PRESSURE: 81 MMHG

## 2023-12-20 DIAGNOSIS — Z90.710 S/P LAPAROSCOPIC HYSTERECTOMY: Primary | ICD-10-CM

## 2023-12-20 PROCEDURE — 99999 PR PBB SHADOW E&M-EST. PATIENT-LVL III: CPT | Mod: PBBFAC,,, | Performed by: STUDENT IN AN ORGANIZED HEALTH CARE EDUCATION/TRAINING PROGRAM

## 2023-12-20 PROCEDURE — 99999 PR PBB SHADOW E&M-EST. PATIENT-LVL III: ICD-10-PCS | Mod: PBBFAC,,, | Performed by: STUDENT IN AN ORGANIZED HEALTH CARE EDUCATION/TRAINING PROGRAM

## 2023-12-20 PROCEDURE — 99499 NO LOS: ICD-10-PCS | Mod: S$GLB,,, | Performed by: STUDENT IN AN ORGANIZED HEALTH CARE EDUCATION/TRAINING PROGRAM

## 2023-12-20 PROCEDURE — 99499 UNLISTED E&M SERVICE: CPT | Mod: S$GLB,,, | Performed by: STUDENT IN AN ORGANIZED HEALTH CARE EDUCATION/TRAINING PROGRAM

## 2023-12-20 RX ORDER — IBUPROFEN 600 MG/1
600 TABLET ORAL EVERY 6 HOURS PRN
Qty: 40 TABLET | Refills: 0 | Status: SHIPPED | OUTPATIENT
Start: 2023-12-20

## 2023-12-20 NOTE — PROGRESS NOTES
Subjective:      Patient ID: Caty Zapata is a 38 y.o. female.    Chief Complaint:  Post-op Evaluation (Check stitches from hyst)      History of Present Illness  39 yo presents for post-op visit    S/p [facility] BS on  with Dr. Hernandez, overall doing well but has questions about incisions and when to expect suture to dissolve. Reported some pain at the incision sites when touching them.   Has had vaginal intercourse since surgery but denies any bleeding        GYN & OB History  Patient's last menstrual period was 10/20/2023.   Date of Last Pap: 2023    OB History    Para Term  AB Living   2 1       1   SAB IAB Ectopic Multiple Live Births                  # Outcome Date GA Lbr Av/2nd Weight Sex Delivery Anes PTL Lv   2 Para            1                Birth Comments: System Generated. Please review and update pregnancy details.       Review of Systems  Review of Systems   All other systems reviewed and are negative.         Objective:     Physical Exam:   Constitutional: She is oriented to person, place, and time. She appears well-developed and well-nourished.    HENT:   Head: Normocephalic and atraumatic.    Eyes: Conjunctivae and EOM are normal.      Pulmonary/Chest: Effort normal.        Abdominal: Soft.   Lateral port sites healing well     Genitourinary:    Right adnexa and left adnexa normal.      Pelvic exam was performed with patient in the lithotomy position.   The external female genitalia was normal.   Genitalia hair distrobution normal .   No vaginal discharge, tenderness or bleeding in the vagina. Cervix is absent.Uterus is absent.           Musculoskeletal: Normal range of motion.       Neurological: She is alert and oriented to person, place, and time.    Skin: Skin is warm and dry.    Psychiatric: She has a normal mood and affect.         Assessment:     1. S/P laparoscopic hysterectomy         Plan:     1. S/P laparoscopic hysterectomy  - Instructed patient to abstain from  any vaginal intercourse for at least 2-3 more weeks   - Discussed 8-10 week time period for vicryl suture to dissolve  - Continue tylenol and motrin for post-op pain.   - ibuprofen (ADVIL,MOTRIN) 600 MG tablet; Take 1 tablet (600 mg total) by mouth every 6 (six) hours as needed for Pain.  Dispense: 40 tablet; Refill: 0      Elvis Brewer III, MD  [facility] - OB GYN   [address]  [facility] [id number] [phone]

## 2023-12-21 ENCOUNTER — PATIENT MESSAGE (OUTPATIENT)
Dept: OBSTETRICS AND GYNECOLOGY | Facility: CLINIC | Age: 38
End: 2023-12-21
Payer: COMMERCIAL

## 2023-12-23 ENCOUNTER — PATIENT MESSAGE (OUTPATIENT)
Dept: OBSTETRICS AND GYNECOLOGY | Facility: CLINIC | Age: 38
End: 2023-12-23
Payer: COMMERCIAL

## 2023-12-26 ENCOUNTER — PATIENT MESSAGE (OUTPATIENT)
Dept: OBSTETRICS AND GYNECOLOGY | Facility: CLINIC | Age: 38
End: 2023-12-26
Payer: COMMERCIAL

## 2023-12-29 ENCOUNTER — TELEPHONE (OUTPATIENT)
Dept: OBSTETRICS AND GYNECOLOGY | Facility: CLINIC | Age: 38
End: 2023-12-29
Payer: COMMERCIAL

## 2023-12-29 ENCOUNTER — PATIENT MESSAGE (OUTPATIENT)
Dept: OBSTETRICS AND GYNECOLOGY | Facility: CLINIC | Age: 38
End: 2023-12-29
Payer: COMMERCIAL

## 2023-12-29 NOTE — TELEPHONE ENCOUNTER
Contacted pt to confirm rtw date for paperwork. Pt advised rtw date of 12/30/2023. Pt also advised claim number of [id number].   Pt requesting pathology report be uploaded to portal so that she can sent to insurance.

## 2024-01-02 ENCOUNTER — PATIENT MESSAGE (OUTPATIENT)
Dept: OBSTETRICS AND GYNECOLOGY | Facility: CLINIC | Age: 39
End: 2024-01-02
Payer: COMMERCIAL

## 2024-01-02 NOTE — LETTER
January 3, 2024    Caty Zapata  [address]  [place] [address]         [facility] - OB GYN  [address]  [facility] [address]  Phone: [phone] January 3, 2024     Patient: Caty Zapata   YOB: 1985   Date of Visit: 1/2/2024       To Whom It May Concern:    It is my medical opinion that Caty Zapata may return to full duty immediately with no restrictions. Restrictions were lifted as of 12/30/2023.     If you have any questions or concerns, please don't hesitate to call.    Sincerely,        Clive Hernandez MD

## 2024-04-04 ENCOUNTER — TELEPHONE (OUTPATIENT)
Dept: FAMILY MEDICINE | Facility: CLINIC | Age: 39
End: 2024-04-04
Payer: COMMERCIAL

## 2024-04-04 NOTE — TELEPHONE ENCOUNTER
----- Message from Celina Fisher sent at 4/4/2024  8:21 AM CDT -----  Regarding: self [phone]  Type:  Patient Requesting Referral    Who Called: self     Referral to What Specialty: podiatry     Reason for Referral: can't walk due to pain on right foot/ heel.     Does the patient want the referral with a specific physician?:    [facility]    Would the patient rather a call back or a response via [facility]?  Call back     Best Call Back Number: [phone]

## 2024-04-15 ENCOUNTER — OFFICE VISIT (OUTPATIENT)
Dept: OBSTETRICS AND GYNECOLOGY | Facility: CLINIC | Age: 39
End: 2024-04-15
Payer: COMMERCIAL

## 2024-04-15 ENCOUNTER — OFFICE VISIT (OUTPATIENT)
Dept: PODIATRY | Facility: CLINIC | Age: 39
End: 2024-04-15
Payer: COMMERCIAL

## 2024-04-15 VITALS
WEIGHT: 195.13 LBS | BODY MASS INDEX: 31.36 KG/M2 | SYSTOLIC BLOOD PRESSURE: 135 MMHG | HEIGHT: 66 IN | DIASTOLIC BLOOD PRESSURE: 81 MMHG

## 2024-04-15 VITALS
WEIGHT: 195.13 LBS | BODY MASS INDEX: 31.49 KG/M2 | SYSTOLIC BLOOD PRESSURE: 118 MMHG | DIASTOLIC BLOOD PRESSURE: 60 MMHG

## 2024-04-15 DIAGNOSIS — M79.672 LEFT FOOT PAIN: Primary | ICD-10-CM

## 2024-04-15 DIAGNOSIS — R10.2 PELVIC PAIN: Primary | ICD-10-CM

## 2024-04-15 DIAGNOSIS — M72.2 PLANTAR FASCIITIS: ICD-10-CM

## 2024-04-15 DIAGNOSIS — K59.00 CONSTIPATION, UNSPECIFIED CONSTIPATION TYPE: ICD-10-CM

## 2024-04-15 PROCEDURE — 99203 OFFICE O/P NEW LOW 30 MIN: CPT | Mod: S$GLB,,, | Performed by: PODIATRIST

## 2024-04-15 PROCEDURE — 3008F BODY MASS INDEX DOCD: CPT | Mod: CPTII,S$GLB,, | Performed by: OBSTETRICS & GYNECOLOGY

## 2024-04-15 PROCEDURE — 3075F SYST BP GE 130 - 139MM HG: CPT | Mod: CPTII,S$GLB,, | Performed by: PODIATRIST

## 2024-04-15 PROCEDURE — 1159F MED LIST DOCD IN RCRD: CPT | Mod: CPTII,S$GLB,, | Performed by: OBSTETRICS & GYNECOLOGY

## 2024-04-15 PROCEDURE — 1160F RVW MEDS BY RX/DR IN RCRD: CPT | Mod: CPTII,S$GLB,, | Performed by: OBSTETRICS & GYNECOLOGY

## 2024-04-15 PROCEDURE — 3008F BODY MASS INDEX DOCD: CPT | Mod: CPTII,S$GLB,, | Performed by: PODIATRIST

## 2024-04-15 PROCEDURE — 99213 OFFICE O/P EST LOW 20 MIN: CPT | Mod: S$GLB,,, | Performed by: OBSTETRICS & GYNECOLOGY

## 2024-04-15 PROCEDURE — 99999 PR PBB SHADOW E&M-EST. PATIENT-LVL III: CPT | Mod: PBBFAC,,, | Performed by: OBSTETRICS & GYNECOLOGY

## 2024-04-15 PROCEDURE — 1159F MED LIST DOCD IN RCRD: CPT | Mod: CPTII,S$GLB,, | Performed by: PODIATRIST

## 2024-04-15 PROCEDURE — 3079F DIAST BP 80-89 MM HG: CPT | Mod: CPTII,S$GLB,, | Performed by: PODIATRIST

## 2024-04-15 PROCEDURE — 3074F SYST BP LT 130 MM HG: CPT | Mod: CPTII,S$GLB,, | Performed by: OBSTETRICS & GYNECOLOGY

## 2024-04-15 PROCEDURE — 3078F DIAST BP <80 MM HG: CPT | Mod: CPTII,S$GLB,, | Performed by: OBSTETRICS & GYNECOLOGY

## 2024-04-15 PROCEDURE — 99999 PR PBB SHADOW E&M-EST. PATIENT-LVL III: CPT | Mod: PBBFAC,,, | Performed by: PODIATRIST

## 2024-04-15 RX ORDER — DOCUSATE SODIUM 100 MG/1
100 CAPSULE, LIQUID FILLED ORAL 2 TIMES DAILY PRN
Qty: 30 CAPSULE | Refills: 1 | Status: SHIPPED | OUTPATIENT
Start: 2024-04-15

## 2024-04-15 RX ORDER — MELOXICAM 15 MG/1
15 TABLET ORAL DAILY
Qty: 20 TABLET | Refills: 0 | Status: SHIPPED | OUTPATIENT
Start: 2024-04-15

## 2024-04-15 RX ORDER — DICLOFENAC SODIUM 10 MG/G
2 GEL TOPICAL DAILY
Qty: 100 G | Refills: 3 | Status: SHIPPED | OUTPATIENT
Start: 2024-04-15

## 2024-04-15 NOTE — PROGRESS NOTES
[facility]  Ambulatory Clinic  Obstetrics & Gynecology    Visit Date:  4/15/2024    Chief Complaint:  Constipation, pain with intercourse    History of Present Illness:      Caty Zapata is a 39 y.o.  with c/o constipation and pain with intercourse off and on for past month.    Pt has total laparoscopic hysterectomy ~2023 for severe cervical dysplasia.  Pt reports doing well since her surgery.      Pt has tried supportive care measures OTC with some relief.  Constipation does not occur all the time.  Pt is able to have bowel mov't when she is able to go.  Denies blood in stool.  Has normal appetite.     Pt denies urinary sxs.      Pt reports occasional mild pain with deep penetration.  GYN pelvic exam today was benign.    Pt denies vaginal bleeding, vaginal discharge, bloating, early satiety, unintentional weight loss, breast mass/skin changes, or urinary complaints.      Otherwise, the pt is in her usual state of health.    Review of Systems:      GENERAL:  No fever, fatigue, excessive weight gain or loss  HEENT:  No headaches, hearing changes, visual disturbance  RESPIRATORY:  No cough, shortness of breath  CARDIOVASCULAR:  No chest pain, heart palpitations, leg swelling  GASTROINTESTINAL:  No nausea, vomiting, rectal bleeding   GENITOURINARY:  See HPI    Physical Exam:     /60   Wt 88.5 kg (195 lb 1.7 oz)   LMP 10/20/2023   BMI 31.49 kg/m²   Pulse 60's, Resp rate 12     GENERAL:  No acute distress, well-nourished  HEENT:  Atraumatic, anicteric, moist mucus membranes.   LUNGS:  Clear normal respiratory effort  HEART:  Regular rate and rhythm  ABDOMEN:  Soft, non-tender, non-distended, normoactive bowel sounds, no obvious organomegaly  EXT:  Symmetric w/o cramping, claudication, or edema. +2 distal pulses.  SKIN:  No rashes or bruising  PSYCH:  Mood and affect appropriate  NEURO:  Grossly intact bilaterally    GENITOURINARY:    VULVAR:  Female external genitalia w/o any  obvious lesions. Normal urethral meatus. No gross lymphadenopathy.   VAGINA:  Normal vagina mucosa. Adequate support. No significant cystocele or rectocele. No obvious lesion. No discharge.  CERVIX:   Surgically absent. Vaginal cuff well healed. No cuff lesions or tenderness.     UTERUS:  Surgically absent.   ADNEXA:  No masses, non-tender   RECTAL:  Deferred. No obvious external lesions    Chaperone present for exam.    Assessment/Plan:     39 y.o. :    Constipation    Discussed natural course of constipation.  Discussed supportive care measures.  Dietary advice.  Increase hydration.  Colace prn.    Dietary advice.      Pelvic pain    Discussed pelvic pain.    No acute GYN findings on pelvic exam, suspect pain is musculoskeletal +/- constipation, no alarming sxs today or acute GI/pelvic findings.    Order pelvic US   Discussed supportive care measures.    NSAIDs prn.    Pelvic/GI precautions.    Encourage healthy lifestyle modifications.  F/u with PCP for health maintenance.  Return 2024 for gynecologic exam or sooner as needed, pt advised to call and schedule.  All questions answered, pt voiced understanding.        Clive Hernandez MD

## 2024-04-16 NOTE — PROGRESS NOTES
Subjective:     Patient ID: Caty Zapata is a 39 y.o. female.    Chief Complaint: Foot Pain    Caty is a 39 y.o. female who presents to the podiatry clinic  with complaint of  left foot pain. Onset of the symptoms was several days ago. Precipitating event: none known. Current symptoms include: ability to bear weight, but with some pain. Aggravating factors: any weight bearing. Symptoms have progressed to a point and plateaued. Patient has had no prior foot problems. Evaluation to date: none. Treatment to date: none. Patients rates pain 4/10 on pain scale.    Review of Systems   Constitutional: Negative for chills.   Cardiovascular:  Negative for chest pain and claudication.   Respiratory:  Negative for cough.    Skin:  Positive for color change, dry skin and nail changes.   Musculoskeletal:  Positive for joint pain.   Gastrointestinal:  Negative for nausea.   Neurological:  Positive for paresthesias. Negative for numbness.   Psychiatric/Behavioral:  The patient is not nervous/anxious.         Objective:     Physical Exam  Constitutional:       Appearance: She is well-developed.      Comments: Oriented to time, place, and person.   Cardiovascular:      Comments: DP and PT pulses are palpable bilaterally. 3 sec capillary refill time and toes and feet are warm to touch proximally .  There is  hair growth on the feet and toes b/l. There is no edema b/l. No spider veins or varicosities present b/l.     Musculoskeletal:      Comments: Equinus noted b/l ankles with < 10 deg DF noted. MMT 5/5 in DF/PF/Inv/Ev resistance with no reproduction of pain in any direction. Passive range of motion of ankle and pedal joints is painless b/l.  Pain on palpation plantar medial left heel, no pain with ROM or MMT or medial and lateral compression of heel, - tinel's sign     Feet:      Right foot:      Skin integrity: No callus or dry skin.      Left foot:      Skin integrity: No callus or dry skin.   Lymphadenopathy:      Comments:  Negative lymphadenopathy bilateral popliteal fossa and tarsal tunnel.   Skin:     Comments: No open lesions, lacerations or wounds noted.Interdigital spaces clean, dry and intact b/l. No erythema noted to b/l foot.  Nails normal color and trophic qualities.     Neurological:      Mental Status: She is alert.      Comments: Light touch, proprioception, and sharp/dull sensation are all intact bilaterally. Protective threshold with the Glide-Wienstein monofilament is intact bilaterally.    Psychiatric:         Behavior: Behavior is cooperative.           Assessment:      Encounter Diagnoses   Name Primary?    Left foot pain Yes    Plantar fasciitis      Plan:     Caty was seen today for foot pain.    Diagnoses and all orders for this visit:    Left foot pain    Plantar fasciitis    Other orders  -     meloxicam (MOBIC) 15 MG tablet; Take 1 tablet (15 mg total) by mouth once daily.  -     diclofenac sodium (VOLTAREN) 1 % Gel; Apply 2 g topically once daily.      I counseled the patient on her conditions, their implications and medical management.      Discussed different treatment options for heel pain. including conservative and interventional.  I gave written and verbal instructions on heel cord stretching and this was demonstrated for the patient. Patient expressed understanding. Discussed wearing appropriate shoe gear and avoiding flats, slippers, sandals, barefoot, and sockfeet. Recommended arch supports. My recommendation for OTC supports is [employer], [employer]ICS tennis shoes.       Patient instructed on adequate icing techniques. Patient should ice the affected area at least once per day x 10 minutes for 10 days . I advised the  patient that extra icing would also be beneficial to ensure adequate anti inflammatory effect     Stretching handout dispensed to patient. Instructions on adequate stretching reviewed in clinic      Rx Voltaren gel to be applied to affected area up to 3-4 x daily as needed for  pain    Patient instructed on adequate icing techniques. Patient should ice the affected area at least once per day x 10 minutes for 10 days . I advised the patient that extra icing would also be beneficial to ensure adequate anti inflammatory effect      Mobic prescribed. Patient was instructed on dosing information. Discontinue if adverse effects occur      RTC PRN

## 2024-04-23 ENCOUNTER — OFFICE VISIT (OUTPATIENT)
Dept: FAMILY MEDICINE | Facility: CLINIC | Age: 39
End: 2024-04-23
Payer: COMMERCIAL

## 2024-04-23 VITALS
DIASTOLIC BLOOD PRESSURE: 78 MMHG | TEMPERATURE: 98 F | SYSTOLIC BLOOD PRESSURE: 114 MMHG | HEART RATE: 86 BPM | OXYGEN SATURATION: 99 % | BODY MASS INDEX: 31.18 KG/M2 | WEIGHT: 194 LBS | HEIGHT: 66 IN

## 2024-04-23 DIAGNOSIS — K59.00 CONSTIPATION, UNSPECIFIED CONSTIPATION TYPE: ICD-10-CM

## 2024-04-23 DIAGNOSIS — M72.2 PLANTAR FASCIITIS OF RIGHT FOOT: ICD-10-CM

## 2024-04-23 DIAGNOSIS — M77.11 LATERAL EPICONDYLITIS OF RIGHT ELBOW: Primary | ICD-10-CM

## 2024-04-23 PROCEDURE — 3078F DIAST BP <80 MM HG: CPT | Mod: CPTII,S$GLB,, | Performed by: FAMILY MEDICINE

## 2024-04-23 PROCEDURE — 1159F MED LIST DOCD IN RCRD: CPT | Mod: CPTII,S$GLB,, | Performed by: FAMILY MEDICINE

## 2024-04-23 PROCEDURE — 99999 PR PBB SHADOW E&M-EST. PATIENT-LVL III: CPT | Mod: PBBFAC,,, | Performed by: FAMILY MEDICINE

## 2024-04-23 PROCEDURE — 3008F BODY MASS INDEX DOCD: CPT | Mod: CPTII,S$GLB,, | Performed by: FAMILY MEDICINE

## 2024-04-23 PROCEDURE — 99214 OFFICE O/P EST MOD 30 MIN: CPT | Mod: S$GLB,,, | Performed by: FAMILY MEDICINE

## 2024-04-23 PROCEDURE — 3074F SYST BP LT 130 MM HG: CPT | Mod: CPTII,S$GLB,, | Performed by: FAMILY MEDICINE

## 2024-04-23 RX ORDER — PREDNISONE 10 MG/1
TABLET ORAL
Qty: 30 TABLET | Refills: 0 | Status: SHIPPED | OUTPATIENT
Start: 2024-04-23

## 2024-04-23 NOTE — LETTER
April 23, 2024      Rhonda Evangelista [address] [place] [address]  Phone: [phone]  Fax: [phone]       Patient: Caty Zapata   YOB: 1985  Date of Visit: 04/23/2024    To Whom It May Concern:    Prema Zapata  was at [facility] on 04/23/2024. Please excuse patient for missed hours on this date. If you have any questions or concerns, or if I can be of further assistance, please do not hesitate to contact me.    Sincerely,    Diana Coello MA

## 2024-04-23 NOTE — PROGRESS NOTES
Subjective     Patient ID: Caty Zapata is a 39 y.o. female.    Chief Complaint: Elbow Pain and Foot Pain    Elbow Pain  This is a chronic problem. The current episode started more than 1 month ago. The problem occurs constantly. Associated symptoms comments: She has no swelling or redness. She states she works with her arm. Treatments tried: biofreeze /tigerbaum. amd the cream for her foot. The treatment provided mild relief.   Foot Pain    She states she saw a podiatrist. She has right foot pain. She has it in the heel. She states she was diagnosed with plantar fasciitis. She states she was given a boot, but it didn't improve. She was told to return for steroids if no improvement. She walks 12 hour on it.    She also has right elbow pain for a month. She states she denies trauma. There is no swelling or redness. She states lifting something as small as a pin is painful .    She is also gassy and doesn't feel like she empites with bowel movements. She is gassy and crampy.     Past Medical History:   Diagnosis Date    Abnormal Pap smear     Cervical cancer     Vaginal delivery     x1      Past Surgical History:   Procedure Laterality Date    KNEE SURGERY  2015    patellar surgery. dr. vazquez    LAPAROSCOPIC TOTAL HYSTERECTOMY N/A 11/17/2023    Procedure: HYSTERECTOMY, TOTAL, LAPAROSCOPIC; BILATERAL SALPINGECTOMY;  Surgeon: Clive Hernandez MD;  Location: [facility];  Service: OB/GYN;  Laterality: N/A;  RN PREOP 11/8/2023   T/S--done, UPT--NEG  ON 11/16/2023--CLEARED BY  CARDS    [facility]   2002    of cervix     Family History   Problem Relation Name Age of Onset    Diabetes Mother      Hypertension Mother      Bell's palsy Mother      Stroke Paternal Grandfather      Cervical cancer Sister      Breast cancer Maternal Grandmother      Breast cancer Maternal Aunt      Colon cancer Neg Hx      Ovarian cancer Neg Hx       Social History     Socioeconomic History    Marital status: Single   Occupational History     Comment:  "works for [employer].    Tobacco Use    Smoking status: Every Day     Current packs/day: 0.10     Average packs/day: 0.1 packs/day for 10.0 years (1.0 ttl pk-yrs)     Types: Cigarettes    Smokeless tobacco: Never    Tobacco comments:     down to 2 cigarettes   Substance and Sexual Activity    Alcohol use: Yes     Comment: occassional    Drug use: No    Sexual activity: Yes     Partners: Male     Birth control/protection: None     Social Determinants of Health     Stress: No Stress Concern Present (8/20/2020)    [facility] of Occupational Health - Occupational Stress Questionnaire     Feeling of Stress : Not at all       Review of Systems       Objective   Vitals:    04/23/24 1440   BP: 114/78   Pulse: 86   Temp: 98.1 °F (36.7 °C)   TempSrc: Oral   SpO2: 99%   Weight: 88 kg (194 lb 0.1 oz)   Height: 5' 6" (1.676 m)       Physical Exam  Constitutional:       Appearance: Normal appearance.   Cardiovascular:      Rate and Rhythm: Normal rate and regular rhythm.      Heart sounds: Normal heart sounds. No murmur heard.     No friction rub. No gallop.   Abdominal:      General: Abdomen is flat. Bowel sounds are normal. There is no distension.      Palpations: Abdomen is soft. There is no mass.      Tenderness: There is no abdominal tenderness. There is no guarding or rebound.      Hernia: No hernia is present.   Musculoskeletal:        Arms:       Comments: Tenderness to palpation     Neurological:      Mental Status: She is alert.            Assessment and Plan     1. Lateral epicondylitis of right elbow  -     predniSONE (DELTASONE) 10 MG tablet; Take 4 pills po daily x 3 days, then 3 pills po daily x 3 days, then 2 pills po daily x 3 days, then 1 pill po daily x 3 days.  Dispense: 30 tablet; Refill: 0    2. Plantar fasciitis of right foot  -     predniSONE (DELTASONE) 10 MG tablet; Take 4 pills po daily x 3 days, then 3 pills po daily x 3 days, then 2 pills po daily x 3 days, then 1 pill po " daily x 3 days.  Dispense: 30 tablet; Refill: 0    3. Constipation, unspecified constipation type  -     linaCLOtide (LINZESS) 72 mcg Cap capsule; Take 1 capsule (72 mcg total) by mouth before breakfast.  Dispense: 90 capsule; Refill: 1                 No follow-ups on file.

## 2024-07-22 ENCOUNTER — OFFICE VISIT (OUTPATIENT)
Dept: PODIATRY | Facility: CLINIC | Age: 39
End: 2024-07-22
Payer: COMMERCIAL

## 2024-07-22 ENCOUNTER — OFFICE VISIT (OUTPATIENT)
Dept: FAMILY MEDICINE | Facility: CLINIC | Age: 39
End: 2024-07-22
Payer: COMMERCIAL

## 2024-07-22 VITALS — HEIGHT: 66 IN | BODY MASS INDEX: 32.38 KG/M2 | WEIGHT: 201.5 LBS

## 2024-07-22 VITALS
BODY MASS INDEX: 32.38 KG/M2 | SYSTOLIC BLOOD PRESSURE: 122 MMHG | HEART RATE: 74 BPM | TEMPERATURE: 98 F | WEIGHT: 201.5 LBS | HEIGHT: 66 IN | OXYGEN SATURATION: 99 % | DIASTOLIC BLOOD PRESSURE: 80 MMHG

## 2024-07-22 DIAGNOSIS — M72.2 PLANTAR FASCIITIS: ICD-10-CM

## 2024-07-22 DIAGNOSIS — G56.92 NEUROPATHY OF HAND, LEFT: Primary | ICD-10-CM

## 2024-07-22 DIAGNOSIS — M77.10 LATERAL EPICONDYLITIS, UNSPECIFIED LATERALITY: ICD-10-CM

## 2024-07-22 DIAGNOSIS — M79.671 PAIN OF RIGHT HEEL: Primary | ICD-10-CM

## 2024-07-22 PROCEDURE — 3079F DIAST BP 80-89 MM HG: CPT | Mod: CPTII,S$GLB,, | Performed by: FAMILY MEDICINE

## 2024-07-22 PROCEDURE — 3008F BODY MASS INDEX DOCD: CPT | Mod: CPTII,S$GLB,, | Performed by: FAMILY MEDICINE

## 2024-07-22 PROCEDURE — 99999 PR PBB SHADOW E&M-EST. PATIENT-LVL III: CPT | Mod: PBBFAC,,, | Performed by: PODIATRIST

## 2024-07-22 PROCEDURE — 96372 THER/PROPH/DIAG INJ SC/IM: CPT | Mod: S$GLB,,, | Performed by: FAMILY MEDICINE

## 2024-07-22 PROCEDURE — 99214 OFFICE O/P EST MOD 30 MIN: CPT | Mod: 25,S$GLB,, | Performed by: FAMILY MEDICINE

## 2024-07-22 PROCEDURE — 99499 UNLISTED E&M SERVICE: CPT | Mod: S$GLB,,, | Performed by: PODIATRIST

## 2024-07-22 PROCEDURE — 3074F SYST BP LT 130 MM HG: CPT | Mod: CPTII,S$GLB,, | Performed by: FAMILY MEDICINE

## 2024-07-22 PROCEDURE — 1159F MED LIST DOCD IN RCRD: CPT | Mod: CPTII,S$GLB,, | Performed by: FAMILY MEDICINE

## 2024-07-22 PROCEDURE — 99999 PR PBB SHADOW E&M-EST. PATIENT-LVL IV: CPT | Mod: PBBFAC,,, | Performed by: FAMILY MEDICINE

## 2024-07-22 PROCEDURE — 20550 NJX 1 TENDON SHEATH/LIGAMENT: CPT | Mod: RT,S$GLB,, | Performed by: PODIATRIST

## 2024-07-22 RX ORDER — METHYLPREDNISOLONE ACETATE 40 MG/ML
40 INJECTION, SUSPENSION INTRA-ARTICULAR; INTRALESIONAL; INTRAMUSCULAR; SOFT TISSUE
Status: COMPLETED | OUTPATIENT
Start: 2024-07-22 | End: 2024-07-22

## 2024-07-22 RX ORDER — DEXAMETHASONE SODIUM PHOSPHATE 4 MG/ML
2 INJECTION, SOLUTION INTRA-ARTICULAR; INTRALESIONAL; INTRAMUSCULAR; INTRAVENOUS; SOFT TISSUE ONCE
Status: COMPLETED | OUTPATIENT
Start: 2024-07-22 | End: 2024-07-22

## 2024-07-22 RX ORDER — TRIAMCINOLONE ACETONIDE 40 MG/ML
20 INJECTION, SUSPENSION INTRA-ARTICULAR; INTRAMUSCULAR ONCE
Status: COMPLETED | OUTPATIENT
Start: 2024-07-22 | End: 2024-07-22

## 2024-07-22 RX ADMIN — METHYLPREDNISOLONE ACETATE 40 MG: 40 INJECTION, SUSPENSION INTRA-ARTICULAR; INTRALESIONAL; INTRAMUSCULAR; SOFT TISSUE at 10:07

## 2024-07-22 RX ADMIN — TRIAMCINOLONE ACETONIDE 20 MG: 40 INJECTION, SUSPENSION INTRA-ARTICULAR; INTRAMUSCULAR at 02:07

## 2024-07-22 RX ADMIN — DEXAMETHASONE SODIUM PHOSPHATE 2 MG: 4 INJECTION, SOLUTION INTRA-ARTICULAR; INTRALESIONAL; INTRAMUSCULAR; INTRAVENOUS; SOFT TISSUE at 02:07

## 2024-07-22 NOTE — PROGRESS NOTES
Subjective     Patient ID: Caty Zapata is a 39 y.o. female.    Chief Complaint: Finger Pain, Joint Swelling, and feet pain    39 year old with right elbow pain. She states she had pain in her medial aspect of her right elbow. 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 She has tried oral steroid treatment, but was unable to take this as prescribed due to her work schedule.     She sates she had pain  in her left 2nd and 3rd fingers. This is chronic. She has seen orthopedic in the past, but was lost to follow up as she was scared to get the nerve conduction study test done as she heard it is painful.  She states if she touches something it is painful to touch.       Past Medical History:   Diagnosis Date    Abnormal Pap smear     Cervical cancer     Vaginal delivery     x1      Past Surgical History:   Procedure Laterality Date    KNEE SURGERY  2015    patellar surgery. dr. vazquez    LAPAROSCOPIC TOTAL HYSTERECTOMY N/A 11/17/2023    Procedure: HYSTERECTOMY, TOTAL, LAPAROSCOPIC; BILATERAL SALPINGECTOMY;  Surgeon: Clive Hernandez MD;  Location: [facility];  Service: OB/GYN;  Laterality: N/A;  RN PREOP 11/8/2023   T/S--done, UPT--NEG  ON 11/16/2023--CLEARED BY  CARDS    [facility]   2002    of cervix     Family History   Problem Relation Name Age of Onset    Diabetes Mother      Hypertension Mother      Bell's palsy Mother      Stroke Paternal Grandfather      Cervical cancer Sister      Breast cancer Maternal Grandmother      Breast cancer Maternal Aunt      Colon cancer Neg Hx      Ovarian cancer Neg Hx       Social History     Socioeconomic History    Marital status: Single   Occupational History     Comment: works for [employer].    Tobacco Use    Smoking status: Every Day     Current packs/day: 0.10     Average packs/day: 0.1 packs/day for 10.0 years (1.0 ttl pk-yrs)     Types: Cigarettes    Smokeless tobacco: Never    Tobacco comments:     down to 2 cigarettes   Substance and Sexual Activity  "   Alcohol use: Yes     Comment: occassional    Drug use: No    Sexual activity: Yes     Partners: Male     Birth control/protection: None     Social Determinants of Health     Stress: No Stress Concern Present (8/20/2020)    [employer] - Occupational Stress Questionnaire     Feeling of Stress : Not at all       Review of Systems       Objective   Vitals:    07/22/24 0956   BP: 122/80   Pulse: 74   Temp: 98.1 °F (36.7 °C)   TempSrc: Oral   SpO2: 99%   Weight: 91.4 kg (201 lb 8 oz)   Height: 5' 6" (1.676 m)       Physical Exam  Constitutional:       General: She is not in acute distress.     Appearance: Normal appearance. She is not ill-appearing, toxic-appearing or diaphoretic.   Musculoskeletal:      Right elbow: No swelling, deformity, effusion or lacerations. Decreased range of motion. Tenderness present in lateral epicondyle.   Neurological:      Mental Status: She is alert.       Date:  7/ 22 /2024  Time: 10:40 a.m.  Name of the procedure being done: elbow injection  Indications: right lateral epicondylitis.  Patient consent:  ü Document that the indications, risks and alternatives to the procedure were explained to the patient. Note that the patient was given the opportunity to ask questions and that the patient consented to the procedure in writing  Pertinent Lab Values:  N/A  Type of Anesthesia used: i.e. 1% lidocaine with epinephrine.   Description of the procedure: Using sterile prep, local anesthesia, standard position needed for the procedure, device and technique being used. Depomedrol 40 mg with a cc of 1% lidocaine with epinephrine was injected at the most tender spt at a 30 degree angle. Sh toleratied this without any issues.  Complications:none  Estimated blood loss ( if indicated)  Disposition: Pt tolerated the procedure well          Assessment and Plan     1. Neuropathy of hand, left  -     Ambulatory referral/consult to Orthopedics; Future; Expected date: " 07/29/2024  -     Ambulatory referral/consult to Neurology; Future; Expected date: 07/29/2024    2. Lateral epicondylitis, unspecified laterality  -     methylPREDNISolone acetate injection 40 mg                 No follow-ups on file.

## 2024-07-22 NOTE — PROGRESS NOTES
Subjective:     Patient ID: Caty Zapata is a 39 y.o. female.    Chief Complaint: Foot Pain    Caty is a 39 y.o. female who presents to the podiatry clinic  with complaint of  right foot pain. Onset of the symptoms was several weeks ago. Precipitating event: none known. Current symptoms include: ability to bear weight, but with some pain. Aggravating factors: any weight bearing. Symptoms have progressed to a point and plateaued. Patient has had no prior foot problems. Evaluation to date: none. Treatment to date: none.     Review of Systems   Constitutional: Negative for chills.   Cardiovascular:  Negative for chest pain and claudication.   Respiratory:  Negative for cough.    Skin:  Positive for color change, dry skin and nail changes.   Musculoskeletal:  Positive for joint pain.   Gastrointestinal:  Negative for nausea.   Neurological:  Positive for paresthesias. Negative for numbness.   Psychiatric/Behavioral:  The patient is not nervous/anxious.         Objective:     Physical Exam  Constitutional:       Appearance: She is well-developed.      Comments: Oriented to time, place, and person.   Cardiovascular:      Comments: DP and PT pulses are palpable bilaterally. 3 sec capillary refill time and toes and feet are warm to touch proximally .  There is  hair growth on the feet and toes b/l. There is no edema b/l. No spider veins or varicosities present b/l.     Musculoskeletal:      Comments: Equinus noted b/l ankles with < 10 deg DF noted. MMT 5/5 in DF/PF/Inv/Ev resistance with no reproduction of pain in any direction. Passive range of motion of ankle and pedal joints is painless b/l.  Pain on palpation plantar medial right  heel, no pain with ROM or MMT or medial and lateral compression of heel, - tinel's sign     Feet:      Right foot:      Skin integrity: No callus or dry skin.      Left foot:      Skin integrity: No callus or dry skin.   Lymphadenopathy:      Comments: Negative lymphadenopathy bilateral  popliteal fossa and tarsal tunnel.   Skin:     Comments: No open lesions, lacerations or wounds noted.Interdigital spaces clean, dry and intact b/l. No erythema noted to b/l foot.  Nails normal color and trophic qualities.     Neurological:      Mental Status: She is alert.      Comments: Light touch, proprioception, and sharp/dull sensation are all intact bilaterally. Protective threshold with the Ledyard-Wienstein monofilament is intact bilaterally.    Psychiatric:         Behavior: Behavior is cooperative.           Assessment:      Encounter Diagnoses   Name Primary?    Pain of right heel Yes    Plantar fasciitis      Plan:     Caty was seen today for foot pain.    Diagnoses and all orders for this visit:    Pain of right heel    Plantar fasciitis    Other orders  -     dexAMETHasone injection 2 mg  -     triamcinolone acetonide injection 20 mg      I counseled the patient on her conditions, their implications and medical management.        Discussed different treatment options for heel pain. including conservative and interventional.  I gave written and verbal instructions on heel cord stretching and this was demonstrated for the patient. Patient expressed understanding. Discussed wearing appropriate shoe gear and avoiding flats, slippers, sandals, barefoot, and sockfeet. Recommended arch supports. My recommendation for OTC supports is [employer], ASICS tennis shoes.       Patient instructed on adequate icing techniques. Patient should ice the affected area at least once per day x 10 minutes for 10 days . I advised the  patient that extra icing would also be beneficial to ensure adequate anti inflammatory effect     Stretching handout dispensed to patient. Instructions on adequate stretching reviewed in clinic      Patient would like injection today. Counseled the patient on the potential complications of local injection of corticosteroid including, but not limited to:  Discoloration of skin, erosion of  soft tissue, and increased likelihood of rupture of a soft tissue structure (ie. Plantar fascia, muscle, tendon, ligament, or capsule in the area of injection).  Patient indicates understanding of my explanation, that any questions have been answered to patient satisfaction, and patient gives verbal consent for injection of affected area. Skin was prepped with alcohol and anesthetized with ethyl chloride.  The following mixture was injected into right medial heel approach: 3ccs of mixture of (1cc 1% plain Lidocaine : 1cc 0.5% Marcaine plain:  0.5cc kenalog-40 : 0.5cc dexamethasone) directly into problematic areas.  Patient  tolerated the injection well. Related nearly 100% relief following injection.

## 2024-07-30 ENCOUNTER — TELEPHONE (OUTPATIENT)
Dept: NEUROLOGY | Facility: CLINIC | Age: 39
End: 2024-07-30
Payer: COMMERCIAL

## 2024-07-30 DIAGNOSIS — M25.532 LEFT WRIST PAIN: Primary | ICD-10-CM

## 2024-08-13 ENCOUNTER — OFFICE VISIT (OUTPATIENT)
Dept: NEUROLOGY | Facility: CLINIC | Age: 39
End: 2024-08-13
Payer: COMMERCIAL

## 2024-08-13 VITALS
WEIGHT: 203.06 LBS | BODY MASS INDEX: 32.64 KG/M2 | SYSTOLIC BLOOD PRESSURE: 132 MMHG | DIASTOLIC BLOOD PRESSURE: 76 MMHG | HEIGHT: 66 IN | HEART RATE: 70 BPM

## 2024-08-13 DIAGNOSIS — M79.642 PAIN OF LEFT HAND: ICD-10-CM

## 2024-08-13 DIAGNOSIS — R20.2 PARESTHESIAS: Primary | ICD-10-CM

## 2024-08-13 PROCEDURE — 99215 OFFICE O/P EST HI 40 MIN: CPT | Mod: S$GLB,,,

## 2024-08-13 PROCEDURE — 3078F DIAST BP <80 MM HG: CPT | Mod: CPTII,S$GLB,,

## 2024-08-13 PROCEDURE — 99999 PR PBB SHADOW E&M-EST. PATIENT-LVL III: CPT | Mod: PBBFAC,,,

## 2024-08-13 PROCEDURE — 1159F MED LIST DOCD IN RCRD: CPT | Mod: CPTII,S$GLB,,

## 2024-08-13 PROCEDURE — 3075F SYST BP GE 130 - 139MM HG: CPT | Mod: CPTII,S$GLB,,

## 2024-08-13 PROCEDURE — 3008F BODY MASS INDEX DOCD: CPT | Mod: CPTII,S$GLB,,

## 2024-08-13 RX ORDER — GABAPENTIN 100 MG/1
100 CAPSULE ORAL NIGHTLY
Qty: 30 CAPSULE | Refills: 2 | Status: SHIPPED | OUTPATIENT
Start: 2024-08-13 | End: 2024-11-11

## 2024-08-13 NOTE — PROGRESS NOTES
[facility] VISIT    Chief Complaint and Duration     Chief Complaint   Patient presents with    Consult     Referred by PATY Hill   Left hand numbness and tingling     Numbness    for several years.    History of Present Illness     Caty Zapata is a 39 y.o. right handed female with a history of multiple medical diagnoses as listed below that presents for left hand pain.     Patient presents to clinic visit alone  Patient has significant past medical history of mixed hyperlipidemia patella fracture, right elbow tendonitis right foot plantar fasciitis    Patient reports a chronic history of left hand paresthesias pain in weakness.  Patient endorses this has been an issue for several years she was seen by orthopedic physician in the past in which they ordered an EMG nerve conduct study she states she did not have this done because she was afraid that it will be painful.  Patient was lost to follow up in presents to clinic on today with paresthesias to left hand with the pain and weakness.  Symptoms are most severe in the thumb, 1st and 2nd digit.  Sparing the palmar surface.  Progression since onset has become worse as symptoms are no longer intermittent are now constant.  She reports pain for-8 on a 0-10 scale with paresthesia characteristics as numbness, tingling, stabbing, hypersensitivity.  She denies any burning or abnormal temperature sensations.  Symptoms are daily constantly aggravated by prolonged activity and worse when fingertips are being touched.  Associated symptoms include weakness limited range of motion and fine motor skills patient also reports that her hand can lock.  She denies any muscle spasms or twitching or involuntary movement.  Patient reports no current intervention or improvements with use of treatment.  She does endorse taking Tylenol and ibuprofen daily for previous orthopedic diagnoses.  Patient denies diabetes, vascular insufficiency, strokes,  cardiac surgery, neurologic disease, head trauma.  Patient endorses positive nighttime symptoms.  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  Patient also states that she is leaving current job and has a interview with a new job this week. Patient denies any difficulties or issues with memory, vision, ambulation, speech, balance, excessive sweating, heat intolerance, early satiety, orthostatic dizziness, syncope, changes in hair or nail color or muscle atrophy.    Review of patient's allergies indicates:  No Known Allergies  Current Outpatient Medications   Medication Sig Dispense Refill    diclofenac sodium (VOLTAREN) 1 % Gel Apply 2 g topically once daily. 100 g 3    docusate sodium (COLACE) 100 MG capsule Take 1 capsule (100 mg total) by mouth 2 (two) times daily as needed for Constipation. 30 capsule 1    ibuprofen (ADVIL,MOTRIN) 600 MG tablet Take 1 tablet (600 mg total) by mouth every 6 (six) hours as needed for Pain. 40 tablet 0    linaCLOtide (LINZESS) 72 mcg Cap capsule Take 1 capsule (72 mcg total) by mouth before breakfast. 90 capsule 1    meloxicam (MOBIC) 15 MG tablet Take 1 tablet (15 mg total) by mouth once daily. 20 tablet 0    predniSONE (DELTASONE) 10 MG tablet Take 4 pills po daily x 3 days, then 3 pills po daily x 3 days, then 2 pills po daily x 3 days, then 1 pill po daily x 3 days. 30 tablet 0    gabapentin (NEURONTIN) 100 MG capsule Take 1 capsule (100 mg total) by mouth every evening. 30 capsule 2     No current facility-administered medications for this visit.       Medical History     Past Medical History:   Diagnosis Date    Abnormal Pap smear     Cervical cancer     Vaginal delivery     x1     Past Surgical History:   Procedure Laterality Date    KNEE SURGERY  2015    patellar surgery. dr. vazquez    LAPAROSCOPIC TOTAL HYSTERECTOMY N/A 11/17/2023    Procedure: HYSTERECTOMY, TOTAL, LAPAROSCOPIC; BILATERAL SALPINGECTOMY;  Surgeon: Clive Hernandez MD;  Location:  [facility] OR;  Service: OB/GYN;  Laterality: N/A;  RN PREOP 11/8/2023   T/S--done, UPT--NEG  ON 11/16/2023--CLEARED BY  CARDS    ODILIA   2002    of cervix     Family History   Problem Relation Name Age of Onset    Diabetes Mother      Hypertension Mother      Bell's palsy Mother      Stroke Paternal Grandfather      Cervical cancer Sister      Breast cancer Maternal Grandmother      Breast cancer Maternal Aunt      Colon cancer Neg Hx      Ovarian cancer Neg Hx       Social History     Socioeconomic History    Marital status: Single   Occupational History     Comment: works for [employer]    Tobacco Use    Smoking status: Every Day     Current packs/day: 0.10     Average packs/day: 0.1 packs/day for 10.0 years (1.0 ttl pk-yrs)     Types: Cigarettes    Smokeless tobacco: Never    Tobacco comments:     down to 2 cigarettes   Substance and Sexual Activity    Alcohol use: Yes     Comment: occassional    Drug use: No    Sexual activity: Yes     Partners: Male     Birth control/protection: None     Social Determinants of Health     Financial Resource Strain: Low Risk  (8/12/2024)    Overall Financial Resource Strain (CARDIA)     Difficulty of Paying Living Expenses: Not hard at all   Food Insecurity: No Food Insecurity (8/12/2024)    Hunger Vital Sign     Worried About Running Out of Food in the Last Year: Never true     Ran Out of Food in the Last Year: Never true   Physical Activity: Sufficiently Active (8/12/2024)    Exercise Vital Sign     Days of Exercise per Week: 7 days     Minutes of Exercise per Session: 150+ min   Stress: No Stress Concern Present (8/12/2024)    [place] [employer] - Occupational Stress Questionnaire     Feeling of Stress : Only a little   Housing Stability: Unknown (8/12/2024)    Housing Stability Vital Sign     Unable to Pay for Housing in the Last Year: No       Exam     Vitals:    08/13/24 1106   BP: 132/76   Pulse: 70      Physical Exam:  General: Not in  acute distress. Not ill-appearing.   HENT: Normocephalic and atraumatic. Moist mucous membranes.  Eyes: Conjunctivae normal.   Pulmonary: Pulmonary effort is normal.   Abdominal: Abdomen is soft and flat.   Skin: Skin is warm and dry. No rashes.   Psychiatric: Mood normal.        Neurologic Exam   Mental status: oriented to person, place, and time  Attention: Normal. Concentration: normal.  Speech: speech is normal.  Cranial Nerves: PERRL, EOMI intact, V1-V3 Facial sensation intact. Symmetric facies. Hearing grossly intact. Palate and uvula midline, symmetric. No tongue deviation. Trapezius strength intact.     Motor exam: bulk and tone normal. Strength 5/5 in bilateral upper extremities: deltoids, biceps, triceps, wrist flexion/extension, 3/5 left finger abduction/adduction. Strength 5/5 in bilateral lower extremities: hip flexion/extension, thigh adduction/abduction, knee flexion/extension, dorsiflexion/plantarflexion, foot eversion/inversion.    Reflexes: 2+ in bilateral upper extremities: biceps and brachiaradialis, 2+ in bilateral lower extremities: patellar and achilles  Plantar reflex: normal  Luis's/Clonus: negative    Sensory exam: light touch intact, hypersensitivity present to pinprick    Gait exam: normal  Romberg: negative  Coordination: normal    Tremor: none  Cogwheel rigidity: none  Phalen's sign: Negative  Tinel's sign: Negative    Labs and Imaging     Labs: reviewed  No results found for this or any previous visit (from the past 24 hour(s)).    Imaging:   I have personally reviewed the images performed.     Other procedures: reviewed    Assessment and Plan     Problem List Items Addressed This Visit    None  Visit Diagnoses       Paresthesias    -  Primary    Relevant Medications    gabapentin (NEURONTIN) 100 MG capsule    Other Relevant Orders    EMG W/ ULTRASOUND AND NERVE CONDUCTION TEST 2 Extremities    Folate    Hemoglobin A1C    Immunofixation Electrophoresis    Protein Electrophoresis,  Serum    TSH    Vitamin B1    Vitamin B12    Vitamin B6    Pain of left hand        Relevant Medications    gabapentin (NEURONTIN) 100 MG capsule    Other Relevant Orders    EMG W/ ULTRASOUND AND NERVE CONDUCTION TEST 2 Extremities          Ms. Zapata is a 39-year-old female new to me who presents to clinic for evaluation and recommendations in regards to left hand paresthesia, pain and weakness.  Patient's complaint is chronic in nature occurring over several years she was being previously managed by Orthopedics lost to follow up After not completing EMG. Onset of symptoms have progressively become worse in frequency as now symptoms are constant, paresthesias are described as numbness, tingling, stabbing, hypersensitivity.  Symptoms are present in thumb 1st and 2nd digit on the left hand.  Patient also endorses weakness with limited range of motion and some difficulty with fine motor skills.  She denies any other focal neurologic symptoms or history of diabetes.  Patient reports pain as 4-8 on a 0-10 scale.  Positive nighttime symptoms. Patient denies any use of any pharmacological or non pharmacological treatments takes over-the-counter Tylenol and ibuprofen for chronic orthopedic issues to right elbow and foot. Patient does report upcoming change in occupations.  Physical examination on today is unremarkable except for hypersensitivity to fingertips in left upper extremity.    Discussed multifactorial nature of neuropathy, patient has occupational risk, previous chronic history, orthopedic risk factors, and progression of symptoms over time.  We will order Labs to evaluate if there is an underlying reversible cause of the patients complaint, EMG 2 limb upper to assess changes that may be contributing to patients paresthesia. Otherwise lifestyle modifications including diet, sleep hygiene, and exercise were discussed.  Also instructed patient to take gabapentin dosage 100 mg at bedtime for nighttime symptoms.  In  addition educated patient in great detail on icing and bracing for symptoms daily.  Recommended patient began physical therapy outpatient patient states with her current occupation she is unable to go to physical therapy multiple times a week after she has received her new position she will be able to begin physical therapy.     Questions and concerns were sought and answered to the patient's stated verbal satisfaction. The patient verbalizes understanding and agreement with the above stated treatment plan.      NATALI Arguelles  [facility]  Department of Neurology- [facility]     [phone]    Follow-up: Follow up in about 11 weeks (around 10/29/2024).  After EMG and labs    Time spent on this encounter: 40 minutes. This includes face to face time and non-face to face time preparing to see the patient (eg, review of tests), obtaining and/or reviewing separately obtained history, documenting clinical information in the electronic or other health record, independently interpreting results and communicating results to the patient/family/caregiver, or care coordinator.     This note was created by combination of typed  and M-Modal dictation. Transcription and phonetic errors may be present.  If there are any questions, please contact me.

## 2024-10-14 ENCOUNTER — OFFICE VISIT (OUTPATIENT)
Dept: FAMILY MEDICINE | Facility: CLINIC | Age: 39
End: 2024-10-14
Payer: COMMERCIAL

## 2024-10-14 VITALS
TEMPERATURE: 98 F | BODY MASS INDEX: 34.75 KG/M2 | WEIGHT: 216.25 LBS | HEIGHT: 66 IN | SYSTOLIC BLOOD PRESSURE: 90 MMHG | DIASTOLIC BLOOD PRESSURE: 70 MMHG | OXYGEN SATURATION: 99 % | HEART RATE: 65 BPM

## 2024-10-14 DIAGNOSIS — L81.6 HYPOPIGMENTATION OF SKIN: Primary | ICD-10-CM

## 2024-10-14 DIAGNOSIS — Z72.0 TOBACCO USE: ICD-10-CM

## 2024-10-14 DIAGNOSIS — E66.811 CLASS 1 OBESITY DUE TO EXCESS CALORIES WITHOUT SERIOUS COMORBIDITY WITH BODY MASS INDEX (BMI) OF 34.0 TO 34.9 IN ADULT: ICD-10-CM

## 2024-10-14 DIAGNOSIS — E78.2 MIXED HYPERLIPIDEMIA: ICD-10-CM

## 2024-10-14 DIAGNOSIS — E66.09 CLASS 1 OBESITY DUE TO EXCESS CALORIES WITHOUT SERIOUS COMORBIDITY WITH BODY MASS INDEX (BMI) OF 34.0 TO 34.9 IN ADULT: ICD-10-CM

## 2024-10-14 PROCEDURE — 99213 OFFICE O/P EST LOW 20 MIN: CPT | Mod: S$GLB,,, | Performed by: NURSE PRACTITIONER

## 2024-10-14 PROCEDURE — 99999 PR PBB SHADOW E&M-EST. PATIENT-LVL IV: CPT | Mod: PBBFAC,,, | Performed by: NURSE PRACTITIONER

## 2024-10-14 NOTE — PROGRESS NOTES
Subjective:       Patient ID: Caty Zapata is a 39 y.o. female.    Chief Complaint: Skin Discoloration    HPI     Caty Zapata is a 39 y.o. female patient that presents to clinic with a chief complaint of skin discoloration. Past medical and surgical history reviewed as listed. PCP is Gisella Sam MD , she is  known  to me. Patient reports she had a intraarticular injection on the right elbow in July by Dr. Sam. Reports skin discoloration at the site of injection. No rash, itchiness, or burning. Denies history of vitiligo.     She is also concerned about weight loss.  Eats generally unhealthy diet.  She does not exercise.  She is interested in GLP 1 to kick start weight loss.    Smokes cigarettes daily.  Discussed cessation.  She is not ready to quit at this time unless she starts to lose weight.    Past Medical History:   Diagnosis Date    Abnormal Pap smear     Cervical cancer     Vaginal delivery     x1      Past Surgical History:   Procedure Laterality Date    KNEE SURGERY  2015    patellar surgery. dr. vazquez    LAPAROSCOPIC TOTAL HYSTERECTOMY N/A 11/17/2023    Procedure: HYSTERECTOMY, TOTAL, LAPAROSCOPIC; BILATERAL SALPINGECTOMY;  Surgeon: Clive Hernandez MD;  Location: [facility];  Service: OB/GYN;  Laterality: N/A;  RN PREOP 11/8/2023   T/S--done, UPT--NEG  ON 11/16/2023--CLEARED BY  CARDS    ODILIA   2002    of cervix      Family History   Problem Relation Name Age of Onset    Diabetes Mother      Hypertension Mother      Bell's palsy Mother      Stroke Paternal Grandfather      Cervical cancer Sister      Breast cancer Maternal Grandmother      Breast cancer Maternal Aunt      Colon cancer Neg Hx      Ovarian cancer Neg Hx        Review of patient's allergies indicates:  No Known Allergies  Review of Systems   Cardiovascular:  Negative for chest pain, palpitations and leg swelling.   Skin:  Positive for color change.       Objective:      Vitals:    10/14/24 1045   BP: 90/70   Pulse: 65   Temp: 98.3  "°F (36.8 °C)   TempSrc: Oral   SpO2: 99%   Weight: 98.1 kg (216 lb 4.3 oz)   Height: 5' 6" (1.676 m)      Physical Exam  Vitals and nursing note reviewed.   Constitutional:       General: She is not in acute distress.  HENT:      Head: Normocephalic.   Eyes:      Conjunctiva/sclera: Conjunctivae normal.      Pupils: Pupils are equal, round, and reactive to light.   Pulmonary:      Effort: Pulmonary effort is normal. No respiratory distress.   Musculoskeletal:      Cervical back: Normal range of motion.   Skin:     General: Skin is warm and dry.             Comments: Hypopigmentation noted to right elbow   Neurological:      Mental Status: She is alert and oriented to person, place, and time.      Gait: Gait normal.   Psychiatric:         Mood and Affect: Mood normal.         Behavior: Behavior normal.         Lab Results   Component Value Date    WBC 9.12 11/17/2023    HGB 8.8 (L) 11/17/2023    HCT 30.3 (L) 11/17/2023     11/17/2023    CHOL 191 08/09/2023    TRIG 120 08/09/2023    HDL 38 (L) 08/09/2023    ALT 13 11/08/2023    AST 16 11/08/2023     (L) 11/08/2023    K 5.1 11/08/2023     11/08/2023    CREATININE 0.9 11/08/2023    BUN 11 11/08/2023    CO2 21 (L) 11/08/2023    TSH 0.885 08/13/2024    INR 1.0 12/01/2013    HGBA1C 5.1 08/13/2024      Assessment:       1. Hypopigmentation of skin    2. Class 1 obesity due to excess calories without serious comorbidity with body mass index (BMI) of 34.0 to 34.9 in adult    3. Tobacco use    4. Mixed hyperlipidemia        Plan:       Hypopigmentation of skin  Discussed with patient at length the risk of hypopigmentation with steroid use.    Will monitor for changes in skin color and increased melanin/pigmentation area of concern.    Consider Dermatology referral if symptoms do not improve.  Patient declines dermatology referral at this time.    Class 1 obesity due to excess calories without serious comorbidity with body mass index (BMI) of 34.0 to 34.9 in " adult  Recommend low calorie, Mediterranean diet.    Recommend daily exercise for at least 30 minutes  -     CBC Auto Differential; Future; Expected date: 10/14/2024  -     Comprehensive Metabolic Panel; Future; Expected date: 10/14/2024  -     Lipid Panel; Future; Expected date: 10/14/2024    Tobacco use  Recommend cessation.    Discussed cessation date.    Declined Wellbutrin or Chantix.    Mixed hyperlipidemia  Check lipids.    Medication List with Changes/Refills   Current Medications    DICLOFENAC SODIUM (VOLTAREN) 1 % GEL    Apply 2 g topically once daily.    DOCUSATE SODIUM (COLACE) 100 MG CAPSULE    Take 1 capsule (100 mg total) by mouth 2 (two) times daily as needed for Constipation.    GABAPENTIN (NEURONTIN) 100 MG CAPSULE    Take 1 capsule (100 mg total) by mouth every evening.    LINACLOTIDE (LINZESS) 72 MCG CAP CAPSULE    Take 1 capsule (72 mcg total) by mouth before breakfast.    MELOXICAM (MOBIC) 15 MG TABLET    Take 1 tablet (15 mg total) by mouth once daily.   Discontinued Medications    IBUPROFEN (ADVIL,MOTRIN) 600 MG TABLET    Take 1 tablet (600 mg total) by mouth every 6 (six) hours as needed for Pain.    PREDNISONE (DELTASONE) 10 MG TABLET    Take 4 pills po daily x 3 days, then 3 pills po daily x 3 days, then 2 pills po daily x 3 days, then 1 pill po daily x 3 days.                Lisa Norris, LINO, APRN, FNP-C  [facility]  [facility]onda Evangelista  10/14/2024 11:12 AM

## 2024-10-14 NOTE — LETTER
October 14, 2024      Rhonda Evangelista [address] [place] [address]  Phone: [phone]  Fax: [phone]       Patient: Caty Zapata   YOB: 1985  Date of Visit: 10/14/2024    To Whom It May Concern:    Prema Zapata  was at [facility] on 10/14/2024. The patient may return to work/school on 10/14/2024 with no restrictions. If you have any questions or concerns, or if I can be of further assistance, please do not hesitate to contact me.    Sincerely,      Lisa Norris, DNP

## 2024-10-21 ENCOUNTER — PATIENT MESSAGE (OUTPATIENT)
Dept: NEUROLOGY | Facility: CLINIC | Age: 39
End: 2024-10-21
Payer: COMMERCIAL

## 2024-10-21 ENCOUNTER — TELEPHONE (OUTPATIENT)
Dept: NEUROLOGY | Facility: CLINIC | Age: 39
End: 2024-10-21
Payer: COMMERCIAL

## 2024-10-21 NOTE — TELEPHONE ENCOUNTER
I spoke with the patient in regards to her request about her appointment, patient states that she thought her EMG appt. was cancelled. I informed the patient that it was not, patient states that she was told she had to pay $500 for her EMG visit, but when she talked to her insurance company they informed her that she did not have to pay. I confirmed with the Patient Access Rep. Ke'brandi to verify and she states that the patient does not have to pay.

## 2024-10-22 ENCOUNTER — TELEPHONE (OUTPATIENT)
Dept: NEUROLOGY | Facility: CLINIC | Age: 39
End: 2024-10-22
Payer: COMMERCIAL

## 2024-10-22 NOTE — TELEPHONE ENCOUNTER
----- Message from Tech Luisa sent at 10/22/2024 10:06 AM CDT -----  Regarding: Patient call back  .Type: Patient Call Back    Who called:SELF     What is the request in detail:rescheduled her appointment from 10/28/2024 to 11/07/2024 due to insurance purposes; asking if she should still come in on today for the EMG or should she reschedule closer to the new appointment?     Can the clinic reply by [facility]?no     Would the patient rather a call back or a response via [facility]? Call     Best call back number:.[phone]      Additional Information:

## 2025-01-17 ENCOUNTER — PATIENT MESSAGE (OUTPATIENT)
Dept: NEUROLOGY | Facility: HOSPITAL | Age: 40
End: 2025-01-17

## 2025-01-24 ENCOUNTER — TELEPHONE (OUTPATIENT)
Dept: NEUROLOGY | Facility: CLINIC | Age: 40
End: 2025-01-24

## 2025-02-13 ENCOUNTER — HOSPITAL ENCOUNTER (OUTPATIENT)
Dept: RADIOLOGY | Facility: HOSPITAL | Age: 40
Discharge: HOME | End: 2025-02-13
Attending: PODIATRIST
Payer: COMMERCIAL

## 2025-02-13 ENCOUNTER — OFFICE VISIT (OUTPATIENT)
Dept: PODIATRY | Facility: CLINIC | Age: 40
End: 2025-02-13
Payer: COMMERCIAL

## 2025-02-13 VITALS — WEIGHT: 216.25 LBS | BODY MASS INDEX: 34.75 KG/M2 | HEIGHT: 66 IN

## 2025-02-13 DIAGNOSIS — M79.671 RIGHT FOOT PAIN: Primary | ICD-10-CM

## 2025-02-13 DIAGNOSIS — M79.671 RIGHT FOOT PAIN: ICD-10-CM

## 2025-02-13 DIAGNOSIS — M25.571 SINUS TARSI SYNDROME OF RIGHT ANKLE: ICD-10-CM

## 2025-02-13 PROCEDURE — 99213 OFFICE O/P EST LOW 20 MIN: CPT | Mod: S$GLB,,, | Performed by: PODIATRIST

## 2025-02-13 PROCEDURE — 3008F BODY MASS INDEX DOCD: CPT | Mod: CPTII,S$GLB,, | Performed by: PODIATRIST

## 2025-02-13 PROCEDURE — 73630 X-RAY EXAM OF FOOT: CPT | Mod: 26,RT,, | Performed by: STUDENT IN AN ORGANIZED HEALTH CARE EDUCATION/TRAINING PROGRAM

## 2025-02-13 PROCEDURE — 73630 X-RAY EXAM OF FOOT: CPT | Mod: TC,FY,PO,RT

## 2025-02-13 PROCEDURE — 99999 PR PBB SHADOW E&M-EST. PATIENT-LVL III: CPT | Mod: PBBFAC,,, | Performed by: PODIATRIST

## 2025-02-13 PROCEDURE — 1159F MED LIST DOCD IN RCRD: CPT | Mod: CPTII,S$GLB,, | Performed by: PODIATRIST

## 2025-02-13 NOTE — PROGRESS NOTES
Subjective:     Patient ID: Caty Zapata is a 39 y.o. female.    Chief Complaint: Foot Pain    Caty is a 39 y.o. female who presents to the podiatry clinic  with complaint of  right foot pain. Onset of the symptoms was several weeks ago. Precipitating event: none known. Current symptoms include: ability to bear weight, but with some pain. Aggravating factors: any weight bearing. Symptoms have progressed to a point and plateaued. Patient has had no prior foot problems. Evaluation to date: none. Treatment to date: none.     Review of Systems   Constitutional: Negative for chills.   Cardiovascular:  Negative for chest pain and claudication.   Respiratory:  Negative for cough.    Skin:  Positive for color change, dry skin and nail changes.   Musculoskeletal:  Positive for joint pain.   Gastrointestinal:  Negative for nausea.   Neurological:  Positive for paresthesias. Negative for numbness.   Psychiatric/Behavioral:  The patient is not nervous/anxious.         Objective:     Physical Exam  Constitutional:       Appearance: She is well-developed.      Comments: Oriented to time, place, and person.   Cardiovascular:      Comments: DP and PT pulses are palpable bilaterally. 3 sec capillary refill time and toes and feet are warm to touch proximally .  There is  hair growth on the feet and toes b/l. There is no edema b/l. No spider veins or varicosities present b/l.     Musculoskeletal:      Comments: Equinus noted b/l ankles with < 10 deg DF noted. MMT 5/5 in DF/PF/Inv/Ev resistance with no reproduction of pain in any direction. Passive range of motion of ankle and pedal joints is painless b/l.    Right lateral ankle pain sinus tarsi   Feet:      Right foot:      Skin integrity: No callus or dry skin.      Left foot:      Skin integrity: No callus or dry skin.   Lymphadenopathy:      Comments: Negative lymphadenopathy bilateral popliteal fossa and tarsal tunnel.   Skin:     Comments: No open lesions, lacerations or wounds  noted.Interdigital spaces clean, dry and intact b/l. No erythema noted to b/l foot.  Nails normal color and trophic qualities.     Neurological:      Mental Status: She is alert.      Comments: Light touch, proprioception, and sharp/dull sensation are all intact bilaterally. Protective threshold with the Roswell-Wienstein monofilament is intact bilaterally.    Psychiatric:         Behavior: Behavior is cooperative.           Assessment:      Encounter Diagnoses   Name Primary?    Right foot pain Yes    Sinus tarsi syndrome of right ankle      Plan:     Caty was seen today for foot pain.    Diagnoses and all orders for this visit:    Right foot pain  -     X-Ray Foot Complete Right; Future    Sinus tarsi syndrome of right ankle      I counseled the patient on her conditions, their implications and medical management.        Discussed different treatment options for heel pain. including conservative and interventional.  I gave written and verbal instructions on heel cord stretching and this was demonstrated for the patient. Patient expressed understanding. Discussed wearing appropriate shoe gear and avoiding flats, slippers, sandals, barefoot, and sockfeet. Recommended arch supports. My recommendation for OTC supports is [employer], [employer]ICS tennis shoes.       Patient instructed on adequate icing techniques. Patient should ice the affected area at least once per day x 10 minutes for 10 days . I advised the  patient that extra icing would also be beneficial to ensure adequate anti inflammatory effect     Stretching handout dispensed to patient. Instructions on adequate stretching reviewed in clinic      Dispensed, fitted and gait trained with ankle brace. Instructed to wear with supportive shoe at all times for next 3-4 weeks when placing weight on the foot.      Discussed conservative treatment with shoes of adequate dimensions, material, and style to alleviate symptoms and delay or prevent surgical  intervention.    RTC PRN

## 2025-02-28 ENCOUNTER — HOSPITAL ENCOUNTER (EMERGENCY)
Facility: HOSPITAL | Age: 40
Discharge: HOME | End: 2025-02-28
Attending: EMERGENCY MEDICINE
Payer: COMMERCIAL

## 2025-02-28 VITALS
SYSTOLIC BLOOD PRESSURE: 126 MMHG | OXYGEN SATURATION: 98 % | DIASTOLIC BLOOD PRESSURE: 79 MMHG | BODY MASS INDEX: 33.75 KG/M2 | RESPIRATION RATE: 18 BRPM | WEIGHT: 210 LBS | HEIGHT: 66 IN | HEART RATE: 71 BPM | TEMPERATURE: 98 F

## 2025-02-28 DIAGNOSIS — H66.91 RIGHT OTITIS MEDIA, UNSPECIFIED OTITIS MEDIA TYPE: Primary | ICD-10-CM

## 2025-02-28 LAB
CTP QC/QA: YES
MOLECULAR STREP A: NEGATIVE

## 2025-02-28 PROCEDURE — 25000003 PHARM REV CODE 250

## 2025-02-28 PROCEDURE — 87651 STREP A DNA AMP PROBE: CPT

## 2025-02-28 PROCEDURE — 99283 EMERGENCY DEPT VISIT LOW MDM: CPT

## 2025-02-28 RX ORDER — AMOXICILLIN AND CLAVULANATE POTASSIUM 875; 125 MG/1; MG/1
1 TABLET, FILM COATED ORAL
Status: COMPLETED | OUTPATIENT
Start: 2025-02-28 | End: 2025-02-28

## 2025-02-28 RX ORDER — DEXTROMETHORPHAN HYDROBROMIDE, GUAIFENESIN 5; 100 MG/5ML; MG/5ML
650 LIQUID ORAL EVERY 8 HOURS
Qty: 12 TABLET | Refills: 0 | Status: SHIPPED | OUTPATIENT
Start: 2025-02-28

## 2025-02-28 RX ORDER — AMOXICILLIN AND CLAVULANATE POTASSIUM 875; 125 MG/1; MG/1
1 TABLET, FILM COATED ORAL 2 TIMES DAILY
Qty: 14 TABLET | Refills: 0 | Status: SHIPPED | OUTPATIENT
Start: 2025-02-28

## 2025-02-28 RX ORDER — IBUPROFEN 600 MG/1
600 TABLET ORAL
Status: COMPLETED | OUTPATIENT
Start: 2025-02-28 | End: 2025-02-28

## 2025-02-28 RX ORDER — IBUPROFEN 600 MG/1
600 TABLET ORAL EVERY 6 HOURS PRN
Qty: 20 TABLET | Refills: 0 | Status: SHIPPED | OUTPATIENT
Start: 2025-02-28

## 2025-02-28 RX ADMIN — IBUPROFEN 600 MG: 600 TABLET, FILM COATED ORAL at 11:02

## 2025-02-28 RX ADMIN — AMOXICILLIN AND CLAVULANATE POTASSIUM 1 TABLET: 875; 125 TABLET, FILM COATED ORAL at 11:02

## 2025-02-28 NOTE — Clinical Note
"Caty"Luann Zapata was seen and treated in our emergency department on 2/28/2025.  She may return to work on 03/02/2025.       If you have any questions or concerns, please don't hesitate to call.      Raza Shell PA-C"

## 2025-02-28 NOTE — ED PROVIDER NOTES
"Encounter Date: 2/28/2025       History     Chief Complaint   Patient presents with    Sore Throat     PT to ER with reports of sore throat with right eat pain x 2 days. Pt reports " I can feel a lump on my throat". Pt in no respiratory distress.      39-year-old female with no past medical history presents to the emergency department complaining of right-sided ear pain and a "lump" in her throat".  Patient has not taken any medication her symptoms.  Patient denies the use of ear buds, excessive Q-tip use, or swimming. Patient denies any sore throat, but states it hurts to swallow.  Patient denies sick contacts.  Patient denies any tooth pain and states she recently went to the dentist.  Patient is able to easily open and close her mouth. Patient denies other upper respiratory symptoms including congestion, cough rhinorrhea, sneezing.  Denies fever, chest pain, shortness of breath, nausea, vomiting, syncope.        Review of patient's allergies indicates:  No Known Allergies  Past Medical History:   Diagnosis Date    Abnormal Pap smear     Cervical cancer     Vaginal delivery     x1     Past Surgical History:   Procedure Laterality Date    KNEE SURGERY  2015    patellar surgery. dr. vazquez    LAPAROSCOPIC TOTAL HYSTERECTOMY N/A 11/17/2023    Procedure: HYSTERECTOMY, TOTAL, LAPAROSCOPIC; BILATERAL SALPINGECTOMY;  Surgeon: Clive Hernandez MD;  Location: [facility];  Service: OB/GYN;  Laterality: N/A;  RN PREOP 11/8/2023   T/S--done, UPT--NEG  ON 11/16/2023--CLEARED BY  CARDS    [facility]   2002    of cervix     Family History   Problem Relation Name Age of Onset    Diabetes Mother      Hypertension Mother      Bell's palsy Mother      Stroke Paternal Grandfather      Cervical cancer Sister      Breast cancer Maternal Grandmother      Breast cancer Maternal Aunt      Colon cancer Neg Hx      Ovarian cancer Neg Hx       Social History[1]  Review of Systems   Constitutional:  Negative for chills and fever.   HENT:  Positive " for ear pain. Negative for congestion, ear discharge, facial swelling, rhinorrhea, sore throat and trouble swallowing.    Eyes:  Negative for discharge and itching.   Respiratory:  Negative for cough and shortness of breath.    Cardiovascular:  Negative for chest pain.   Gastrointestinal:  Negative for abdominal pain, nausea and vomiting.   Genitourinary:  Negative for dysuria.   Musculoskeletal:  Negative for back pain.   Skin:  Negative for rash.   Neurological:  Negative for syncope and weakness.   Hematological:  Does not bruise/bleed easily.       Physical Exam     Initial Vitals [02/28/25 0851]   BP Pulse Resp Temp SpO2   126/79 71 18 98.1 °F (36.7 °C) 98 %      MAP       --         Physical Exam    Nursing note and vitals reviewed.  Constitutional: She appears well-developed and well-nourished.   HENT:   Head: Normocephalic and atraumatic.   Right Ear: External ear normal. There is tenderness. No mastoid tenderness. Tympanic membrane is injected. Tympanic membrane is not perforated. A middle ear effusion is present. No hemotympanum.   Left Ear: External ear normal. No tenderness. No mastoid tenderness. Tympanic membrane is not injected and not perforated.  No middle ear effusion. No hemotympanum.   Nose: Nose normal. Mouth/Throat: Uvula is midline, oropharynx is clear and moist and mucous membranes are normal. No oral lesions. No trismus in the jaw. Normal dentition. No dental abscesses, uvula swelling or dental caries. No oropharyngeal exudate, posterior oropharyngeal edema, posterior oropharyngeal erythema or tonsillar abscesses.   Tender tonsillar and submandibular lymph node on the right side.  No overlying erythema or evidence of cellulitis.      Patient is able to easily communicate with me. No hoarseness or hot potato voice. Uvula is midline. No tripoding, drooling, trismus. Patient is able to completely open and close the mouth. No tonsillar abscess.  Pt is able to tolerate oral secretions. Patients  "is not in any respiratory distress.There is no swelling noted to the face, neck or soft tissue of the mouth.     Right ear shows middle ear effusion was injected TM.  No evidence of perforation.  No swelling or drainage.  No mastoid tenderness.   Eyes: Conjunctivae and lids are normal.   Neck: Trachea normal. Neck supple.    Full passive range of motion without pain.     Cardiovascular:  Normal rate, regular rhythm, S1 normal, S2 normal and normal heart sounds.     Exam reveals no gallop and no friction rub.       No murmur heard.  Pulmonary/Chest: Effort normal and breath sounds normal. No respiratory distress. She has no wheezes. She has no rhonchi. She has no rales.   Abdominal: Abdomen is soft. Bowel sounds are normal. She exhibits no distension. There is no abdominal tenderness. There is no rebound, no guarding, no tenderness at McBurney's point and negative Cintron's sign.   Musculoskeletal:         General: Normal range of motion.      Cervical back: Full passive range of motion without pain and neck supple.     Lymphadenopathy:     She has no cervical adenopathy.   Neurological: She is alert. She has normal strength.   Skin: Skin is warm and dry.   Psychiatric: She has a normal mood and affect.         ED Course   Procedures  Labs Reviewed   POCT STREP A MOLECULAR       Result Value    Molecular Strep A, POC Negative       Acceptable Yes            Imaging Results    None          Medications   amoxicillin-clavulanate 875-125mg per tablet 1 tablet (1 tablet Oral Given 2/28/25 1116)   ibuprofen tablet 600 mg (600 mg Oral Given 2/28/25 1116)     Medical Decision Making  Encounter Date: 2/28/2025    39 y.o. female presents for evaluation of right-sided ear pain and "lump" on right side throat.  Hemodynamically stable. Afebrile. Phonating and protecting the airway spontaneously. No clinical evidence for cardiovascular instability or impending airway compromise.  Remainder of physical exam as above. "   Prior medical records reviewed. PMD note reviewed. Current co-morbidities considered that will impact clinical decision making include as above.   Vitals range:   Temp:  [98.1 °F (36.7 °C)] 98.1 °F (36.7 °C)  Pulse:  [71] 71  Resp:  [18] 18  SpO2:  [98 %] 98 %  BP: (126)/(79) 126/79    Differential diagnoses includes but is not limited to: AOM, Otitis Externa, Mastoiditis, TM Rupture, Foreign Body,  Dental Pain,  strep pharyngitis, retropharyngeal abscess, peritonsillar abscess, epiglottitis, Adithya angina.     Tenderness to right ear on physical exam. Right ear shows middle ear effusion was injected TM.  Right ear consistent with acute otitis media.  No evidence of perforation.  No swelling or drainage.  Patient denies the use of ear buds, excessive Q-tip use, or swimming. No mastoid tenderness.  No foreign body in the ear.  Patient denies any dental pain.  No evidence of dental caries.  Patient recently saw a dentist.  I have low suspicion for otitis externa, mastoiditis, TM rupture, foreign body, dental pain. Strep test negative, no oropharyngeal erythema or exudates. Patient is able to easily communicate with me. No hoarseness or hot potato voice. Uvula is midline. No tripoding, drooling, trismus. Patient is able to completely open and close the mouth. No tonsillar abscess.  Pt is able to tolerate oral secretions. Patients is not in any respiratory distress. Lungs are clear to auscultation. There is no swelling noted to the face, neck or soft tissue of the mouth. I have low suspicion for strep pharyngitis, retropharyngeal abscess, peritonsillar abscess, epiglottitis, Adithya angina.   Tender tonsillar and submandibular lymph nodes on the right side most likely due to ear infection in the right ear.  No overlying erythema or cellulitis.    Clinical picture today most consistent with acute otitis media, right ear.   Doubt alternate pathology as listed in the differential above.  Patient was given 1st dose of  Augmentin in the emergency department today.  She was be discharged home with Augmentin and advised to follow up with primary care doctor.    ED MEDS GIVEN:  Medications  amoxicillin-clavulanate 875-125mg per tablet 1 tablet (1 tablet Oral Given 2/28/25 1116)  ibuprofen tablet 600 mg (600 mg Oral Given 2/28/25 1116)    Clinical Impression:  Final diagnoses:  [H66.91] Right otitis media, unspecified otitis media type (Primary)       I discussed with the patient/family the diagnosis, treatment plan, indications for return to the emergency department, and for expected follow-up. The patient/family verbalized an understanding. The patient/family is asked if there are any questions or concerns. We discuss the case, until all issues are addressed to the patient/family's satisfaction. Patient/family understands and is agreeable to the plan.   Raza Shell    DISCLAIMER: This note was prepared with [employer] voice recognition transcription software. Garbled syntax, mangled pronouns, and other bizarre constructions may be attributed to that software system.      Risk  OTC drugs.  Prescription drug management.                                      Clinical Impression:  Final diagnoses:  [H66.91] Right otitis media, unspecified otitis media type (Primary)          ED Disposition Condition    Discharge Stable          ED Prescriptions       Medication Sig Dispense Start Date End Date Auth. Provider    amoxicillin-clavulanate 875-125mg (AUGMENTIN) 875-125 mg per tablet Take 1 tablet by mouth 2 (two) times daily. 14 tablet 2/28/2025 -- PerillouxRaza, PA-C    ibuprofen (ADVIL,MOTRIN) 600 MG tablet Take 1 tablet (600 mg total) by mouth every 6 (six) hours as needed for Pain. 20 tablet 2/28/2025 -- Perilloux, Raza R, PA-C    acetaminophen (TYLENOL) 650 MG TbSR Take 1 tablet (650 mg total) by mouth every 8 (eight) hours. 12 tablet 2/28/2025 -- PerillouxRaza PA-C          Follow-up Information       Follow up With Specialties  Details Why Contact Info    Gisella Sam MD Family Medicine Schedule an appointment as soon as possible for a visit  For further evaluation, more definitive management of symptoms [address] [address]  [phone]      [facility] Emergency Medicine Go to  As needed, If symptoms worsen [address]  [facility] [place] [address] [phone]               [1]   Social History  Tobacco Use    Smoking status: Every Day     Current packs/day: 0.50     Average packs/day: 0.4 packs/day for 29.2 years (10.6 ttl pk-yrs)     Types: Cigarettes     Start date: 2006    Smokeless tobacco: Never    Tobacco comments:     down to 2 cigarettes   Substance Use Topics    Alcohol use: Yes     Comment: occassional    Drug use: No        Raza Shell PA-C  02/28/25 1841

## 2025-02-28 NOTE — DISCHARGE INSTRUCTIONS
You have been diagnosed with a right-sided middle ear infection.  Please take full course of antibiotics as prescribed.  Follow up with your primary care doctor for further evaluation of symptoms and follow up of symptoms.  Alternate Tylenol and ibuprofen for pain.  Thank you for coming to our Emergency Department today. It is important to remember that some problems or medical conditions are difficult to diagnose and may not be found or addressed during your Emergency Department visit.  These conditions often start with non-specific symptoms and can only be diagnosed on follow up visits with your primary care physician or specialist when the symptoms continue or change. Please remember that all medical conditions can change, and we cannot predict how you will be feeling tomorrow or the next day. Return to the ER with any questions/concerns, new/concerning symptoms, worsening or failure to improve.       Be sure to follow up with your primary care doctor and review all labs/imaging/tests that were performed during your ER visit with them. It is very common for us to identify non-emergent incidental findings which must be followed up with your primary care physician.  Some labs/imaging/tests may be outside of the normal range, and require non-emergent follow-up and/or further investigation/treatment/procedures/testing to help diagnose/exclude/prevent complications or other potentially serious medical conditions. Some abnormalities may not have been discussed or addressed during your ER visit. Some lab results may not return during your ER visit but can be accessible by downloading the free [facility] ally or by visiting [email or web address] . It is important for you to review all labs/imaging/tests which are outside of the normal range with your physician.    An ER visit does not replace a primary care visit, and many screening tests or follow-up tests cannot be ordered by an ER doctor or performed by the ER.  Some tests may even require pre-approval.    If you do not have a primary care doctor, you may contact the one listed on your discharge paperwork or you may also call the [facility] Appointment Desk at [phone] , or [address] [place] [address] at  [phone] to schedule an appointment, or establish care with a primary care doctor or even a specialist and to obtain information about local resources. It is important to your health that you have a primary care doctor.    Please take all medications as directed. We have done our best to select a medication for you that will treat your condition however, all medications may potentially have side-effects and it is impossible to predict which medications may give you side-effects or what those side-effects (if any) those medications may give you.  If you feel that you are having a negative effect or side-effect of any medication you should stop taking those medications immediately and seek medical attention. If you feel that you are having a life-threatening reaction call 911.        Do not drive, swim, climb to height, take a bath, operate heavy machinery, drink alcohol or take potentially sedating medications, sign any legal documents or make any important decisions for 24 hours if you have received any pain medications, sedatives or mood altering drugs during your ER visit or within 24 hours of taking them if they have been prescribed to you.     You can find additional resources for Dentists, hearing aids, durable medical equipment, low cost pharmacies and other resources at [email or web address]

## 2025-03-12 DIAGNOSIS — Z12.31 OTHER SCREENING MAMMOGRAM: ICD-10-CM

## 2025-03-21 ENCOUNTER — OFFICE VISIT (OUTPATIENT)
Dept: FAMILY MEDICINE | Facility: CLINIC | Age: 40
End: 2025-03-21
Payer: COMMERCIAL

## 2025-03-21 VITALS
DIASTOLIC BLOOD PRESSURE: 78 MMHG | HEIGHT: 66 IN | TEMPERATURE: 99 F | BODY MASS INDEX: 33.91 KG/M2 | WEIGHT: 211 LBS | OXYGEN SATURATION: 97 % | HEART RATE: 75 BPM | SYSTOLIC BLOOD PRESSURE: 110 MMHG

## 2025-03-21 DIAGNOSIS — L72.0 EPIDERMAL INCLUSION CYST: Primary | ICD-10-CM

## 2025-03-21 PROCEDURE — 99999 PR PBB SHADOW E&M-EST. PATIENT-LVL III: CPT | Mod: PBBFAC,,, | Performed by: NURSE PRACTITIONER

## 2025-03-21 NOTE — PROGRESS NOTES
"Subjective:       Patient ID: Caty Zapata is a 40 y.o. female.    Chief Complaint: Cyst    HPI     Caty Zapata is a 40 y.o. female patient that presents to clinic with a chief complaint of cyst. Past medical and surgical history reviewed as listed. PCP is Gisella Sam MD , she is  known  to me. Patient reports she first noticed left buttock cyst became inflamed and tender. She went to urgent care at [facility] and was given IM antibiotics. Area was not I/D'd. Wound has improved.     ROS as listed.   Past Medical History:   Diagnosis Date    Abnormal Pap smear     Cervical cancer     Vaginal delivery     x1      Past Surgical History:   Procedure Laterality Date    KNEE SURGERY  2015    patellar surgery. dr. vazquez    LAPAROSCOPIC TOTAL HYSTERECTOMY N/A 11/17/2023    Procedure: HYSTERECTOMY, TOTAL, LAPAROSCOPIC; BILATERAL SALPINGECTOMY;  Surgeon: Clive Hernandez MD;  Location: [facility];  Service: OB/GYN;  Laterality: N/A;  RN PREOP 11/8/2023   T/S--done, UPT--NEG  ON 11/16/2023--CLEARED BY  CARDS    LEEP   2002    of cervix      Family History   Problem Relation Name Age of Onset    Diabetes Mother      Hypertension Mother      Bell's palsy Mother      Stroke Paternal Grandfather      Cervical cancer Sister      Breast cancer Maternal Grandmother      Breast cancer Maternal Aunt      Colon cancer Neg Hx      Ovarian cancer Neg Hx        Review of patient's allergies indicates:  No Known Allergies  Review of Systems    Objective:      Vitals:    03/21/25 1101   BP: 110/78   Pulse: 75   Temp: 99 °F (37.2 °C)   TempSrc: Oral   SpO2: 97%   Weight: 95.7 kg (210 lb 15.7 oz)   Height: 5' 6" (1.676 m)      Physical Exam  Vitals and nursing note reviewed.   Constitutional:       General: She is not in acute distress.  HENT:      Head: Normocephalic.   Eyes:      Conjunctiva/sclera: Conjunctivae normal.      Pupils: Pupils are equal, round, and reactive to light.   Pulmonary:      Effort: Pulmonary effort is normal. No " respiratory distress.   Musculoskeletal:      Cervical back: Normal range of motion.   Skin:     General: Skin is warm and dry.      Comments: Left buttock cyst noted; area is flat and tender. No fluctuance noted.   Neurological:      Mental Status: She is alert and oriented to person, place, and time.      Gait: Gait normal.   Psychiatric:         Mood and Affect: Mood normal.         Behavior: Behavior normal.         Lab Results   Component Value Date    WBC 9.12 11/17/2023    HGB 8.8 (L) 11/17/2023    HCT 30.3 (L) 11/17/2023     11/17/2023    CHOL 191 08/09/2023    TRIG 120 08/09/2023    HDL 38 (L) 08/09/2023    ALT 13 11/08/2023    AST 16 11/08/2023     (L) 11/08/2023    K 5.1 11/08/2023     11/08/2023    CREATININE 0.9 11/08/2023    BUN 11 11/08/2023    CO2 21 (L) 11/08/2023    TSH 0.885 08/13/2024    INR 1.0 12/01/2013    HGBA1C 5.1 08/13/2024      Assessment:       1. Epidermal inclusion cyst        Plan:       Epidermal inclusion cyst  Cyst does not require incision and drainage.   Recommend continuing oral antibiotics as prescribed.   She can take tylenol or motrin PRN pain.   Apply warm compress prn.   Patient declined referral to general surgery for removal of cyst wall.    Medication List with Changes/Refills   Current Medications    ACETAMINOPHEN (TYLENOL) 650 MG TBSR    Take 1 tablet (650 mg total) by mouth every 8 (eight) hours.    DICLOFENAC SODIUM (VOLTAREN) 1 % GEL    Apply 2 g topically once daily.    DOCUSATE SODIUM (COLACE) 100 MG CAPSULE    Take 1 capsule (100 mg total) by mouth 2 (two) times daily as needed for Constipation.    GABAPENTIN (NEURONTIN) 100 MG CAPSULE    Take 1 capsule (100 mg total) by mouth every evening.    IBUPROFEN (ADVIL,MOTRIN) 600 MG TABLET    Take 1 tablet (600 mg total) by mouth every 6 (six) hours as needed for Pain.    LINACLOTIDE (LINZESS) 72 MCG CAP CAPSULE    Take 1 capsule (72 mcg total) by mouth before breakfast.    MELOXICAM (MOBIC) 15 MG  TABLET    Take 1 tablet (15 mg total) by mouth once daily.   Discontinued Medications    AMOXICILLIN-CLAVULANATE 875-125MG (AUGMENTIN) 875-125 MG PER TABLET    Take 1 tablet by mouth 2 (two) times daily.                Lisa Norris, DNP, APRN, FNP-C  Family Medicine [facility]  03/21/2025 11:21 AM

## 2025-03-25 ENCOUNTER — HOSPITAL ENCOUNTER (EMERGENCY)
Facility: HOSPITAL | Age: 40
Discharge: HOME | End: 2025-03-25
Payer: COMMERCIAL

## 2025-03-25 VITALS
BODY MASS INDEX: 33.75 KG/M2 | RESPIRATION RATE: 16 BRPM | WEIGHT: 210 LBS | SYSTOLIC BLOOD PRESSURE: 122 MMHG | HEIGHT: 66 IN | TEMPERATURE: 98 F | OXYGEN SATURATION: 97 % | HEART RATE: 78 BPM | DIASTOLIC BLOOD PRESSURE: 74 MMHG

## 2025-03-25 DIAGNOSIS — L02.91 ABSCESS: Primary | ICD-10-CM

## 2025-03-25 PROCEDURE — 10061 I&D ABSCESS COMP/MULTIPLE: CPT

## 2025-03-25 PROCEDURE — 99283 EMERGENCY DEPT VISIT LOW MDM: CPT | Mod: 25

## 2025-03-25 PROCEDURE — 63600175 PHARM REV CODE 636 W HCPCS: Performed by: PHYSICIAN ASSISTANT

## 2025-03-25 RX ORDER — LIDOCAINE HYDROCHLORIDE 10 MG/ML
10 INJECTION, SOLUTION INFILTRATION; PERINEURAL
Status: COMPLETED | OUTPATIENT
Start: 2025-03-25 | End: 2025-03-25

## 2025-03-25 RX ADMIN — LIDOCAINE HYDROCHLORIDE 10 ML: 10 INJECTION, SOLUTION INFILTRATION; PERINEURAL at 11:03

## 2025-03-25 NOTE — DISCHARGE INSTRUCTIONS
Continue taking the antibiotics that were previously prescribed for you.  Remove packing in 2 days.  If packing comes out before the 2 day time period, do not attempt to put it back in.  Return to the emergency department for worsening pain, swelling or fever.  Take over-the-counter Tylenol or Motrin for pain.  Return to the emergency department for any change or concerning symptoms.

## 2025-03-25 NOTE — Clinical Note
"Caty"Luann Zapata was seen and treated in our emergency department on 3/25/2025.  She may return to work on 03/28/2025.       If you have any questions or concerns, please don't hesitate to call.      Nelida Menendez PA-C"

## 2025-03-25 NOTE — ED PROVIDER NOTES
"Encounter Date: 3/25/2025       History     Chief Complaint   Patient presents with    Abscess     Draining abscess to buttocks x 2 weeks.      This is a 40-year-old female with past medical history of cervical cancer who presents to the emergency department with an abscess to her buttock for the past 1.5 weeks.  She states that she has had a cyst for several years but it is recently flared up over the past week and a half.  She went and saw her primary care doctor who started her on Cipro but told her that he could not Pradeep the abscess at that time he told her that she had to go see someone who could "remove the sac".  She reports a constant, aching pain, 7/10, that is worse with weight-bearing on the area.  She reports draining purulent material that started last night.  There are no other alleviating or exacerbating factors.  No other associated symptoms.    The history is provided by the patient. No  was used.     Review of patient's allergies indicates:  No Known Allergies  Past Medical History:   Diagnosis Date    Abnormal Pap smear     Cervical cancer     Vaginal delivery     x1     Past Surgical History:   Procedure Laterality Date    KNEE SURGERY  2015    patellar surgery. dr. vazquez    LAPAROSCOPIC TOTAL HYSTERECTOMY N/A 11/17/2023    Procedure: HYSTERECTOMY, TOTAL, LAPAROSCOPIC; BILATERAL SALPINGECTOMY;  Surgeon: Clive Hernandez MD;  Location: [facility];  Service: OB/GYN;  Laterality: N/A;  RN PREOP 11/8/2023   T/S--done, UPT--NEG  ON 11/16/2023--CLEARED BY  CARDS    LEEP   2002    of cervix     Family History   Problem Relation Name Age of Onset    Diabetes Mother      Hypertension Mother      Bell's palsy Mother      Stroke Paternal Grandfather      Cervical cancer Sister      Breast cancer Maternal Grandmother      Breast cancer Maternal Aunt      Colon cancer Neg Hx      Ovarian cancer Neg Hx       Social History[1]  Review of Systems   Constitutional:  Negative for fever. "   HENT:  Negative for sore throat.    Respiratory:  Negative for shortness of breath.    Cardiovascular:  Negative for chest pain.   Gastrointestinal:  Negative for nausea.   Genitourinary:  Negative for dysuria.   Musculoskeletal:  Negative for back pain.   Skin:  Positive for wound (Buttock). Negative for rash.   Neurological:  Negative for weakness.   Hematological:  Does not bruise/bleed easily.       Physical Exam     Initial Vitals [03/25/25 1037]   BP Pulse Resp Temp SpO2   122/74 78 16 97.9 °F (36.6 °C) 97 %      MAP       --         Physical Exam    Nursing note and vitals reviewed.  Constitutional: She appears well-developed and well-nourished. She is not diaphoretic. No distress.   HENT:   Head: Normocephalic and atraumatic.   Nose: Nose normal.   Eyes: EOM are normal. Pupils are equal, round, and reactive to light.   Neck: Neck supple.   Normal range of motion.  Cardiovascular:  Normal rate and regular rhythm.           No murmur heard.  Pulmonary/Chest: Breath sounds normal. No respiratory distress. She has no wheezes. She has no rhonchi. She has no rales.   Abdominal: Abdomen is soft. Bowel sounds are normal. She exhibits no distension. There is no abdominal tenderness. There is no rebound and no guarding.   Musculoskeletal:         General: No tenderness or edema. Normal range of motion.      Cervical back: Normal range of motion and neck supple.        Legs:      Neurological: She is alert and oriented to person, place, and time. No cranial nerve deficit.   Skin: Skin is warm. Capillary refill takes less than 2 seconds. No rash noted. No erythema.         ED Course   I & D - Incision and Drainage    Date/Time: 3/25/2025 2:06 PM  Location procedure was performed: [facility] EMERGENCY DEPARTMENT    Performed by: Nelida Menendez PA-C  Authorized by: Nelida Menendez PA-C  Type: abscess  Body area: anogenital  Location details: gluteal cleft  Anesthesia: local infiltration    Anesthesia:  Local  Anesthetic: lidocaine 1% without epinephrine  Anesthetic total: 4 mL    Patient sedated: no  Scalpel size: 11  Incision type: single straight  Complexity: complex  Drainage: pus  Drainage amount: scant  Wound treatment: wound packed  Patient tolerance: Patient tolerated the procedure well with no immediate complications        Labs Reviewed - No data to display       Imaging Results    None          Medications   LIDOcaine HCL 10 mg/ml (1%) injection 10 mL (10 mLs Infiltration Given by Other 3/25/25 9849)     Medical Decision Making       APC / Resident Notes:   This is an urgent evaluation of a 40-year-old female presents to the emergency department with an abscess to the gluteal cleft.  She was placed on Cipro by her primary care doctor and states that the abscess began to drain.      The patient is currently afebrile and nontoxic in appearance.  Vital signs are stable.  Physical exam reveals a very small, tender nodule along the gluteal cleft.  Small amount of drainage noted.  No surrounding erythema.  While in the emergency room and incision and drainage was performed.  A small amount of purulent material was expressed.  The wound was packed.  She was encouraged to continue taking the antibiotics as previously prescribed for her.  Packing will need to be removed in 2 days.  Signs and symptoms of worsening were thoroughly reviewed with her and she verbalized understanding and agreement.  She is currently safe and stable for discharge at this time.                               Clinical Impression:  Final diagnoses:  [L02.91] Abscess (Primary)          ED Disposition Condition    Discharge           ED Prescriptions    None       Follow-up Information       Follow up With Specialties Details Why Contact Info    Gisella Sam MD Family Medicine   [address]  [place] [address]  [phone]                   [1]   Social History  Tobacco Use    Smoking status: Every Day     Current packs/day:  0.50     Average packs/day: 0.4 packs/day for 29.2 years (10.6 ttl pk-yrs)     Types: Cigarettes     Start date: 2006    Smokeless tobacco: Never    Tobacco comments:     down to 2 cigarettes   Substance Use Topics    Alcohol use: Yes     Comment: occassional    Drug use: No        Nelida Menendez PA-C  03/25/25 5427

## 2025-04-09 ENCOUNTER — OFFICE VISIT (OUTPATIENT)
Dept: FAMILY MEDICINE | Facility: CLINIC | Age: 40
End: 2025-04-09
Payer: COMMERCIAL

## 2025-04-09 VITALS
OXYGEN SATURATION: 98 % | BODY MASS INDEX: 34.01 KG/M2 | WEIGHT: 211.63 LBS | DIASTOLIC BLOOD PRESSURE: 76 MMHG | SYSTOLIC BLOOD PRESSURE: 118 MMHG | TEMPERATURE: 98 F | HEIGHT: 66 IN | HEART RATE: 86 BPM

## 2025-04-09 DIAGNOSIS — Z72.0 TOBACCO ABUSE: ICD-10-CM

## 2025-04-09 DIAGNOSIS — Z12.31 ENCOUNTER FOR SCREENING MAMMOGRAM FOR BREAST CANCER: ICD-10-CM

## 2025-04-09 DIAGNOSIS — M79.671 RIGHT FOOT PAIN: ICD-10-CM

## 2025-04-09 DIAGNOSIS — Z01.419 WELL WOMAN EXAM: Primary | ICD-10-CM

## 2025-04-09 DIAGNOSIS — M25.571 CHRONIC PAIN OF RIGHT ANKLE: ICD-10-CM

## 2025-04-09 DIAGNOSIS — Z00.00 ANNUAL PHYSICAL EXAM: ICD-10-CM

## 2025-04-09 DIAGNOSIS — G89.29 CHRONIC PAIN OF RIGHT ANKLE: ICD-10-CM

## 2025-04-09 PROCEDURE — 99999 PR PBB SHADOW E&M-EST. PATIENT-LVL V: CPT | Mod: PBBFAC,,, | Performed by: FAMILY MEDICINE

## 2025-04-09 NOTE — PROGRESS NOTES
"Subjective     Patient ID: Caty Zapata is a 40 y.o. female.    Chief Complaint: Referral (Podiatrist ) and Orders (Pt requesting CT/MRI scan )    40 year -old female presents for follow up. She would like to have a referral to podaltry due to chronic right ankle and foot pain and swelling.     She also need a well woman exam. She had a hysterectomy and has not had any follow up. She is due for a mammogram and blood work as well.         History of Present Illness             Past Medical History:   Diagnosis Date    Abnormal Pap smear     Cervical cancer     Vaginal delivery     x1      Past Surgical History:   Procedure Laterality Date    KNEE SURGERY  2015    patellar surgery. dr. vazquez    LAPAROSCOPIC TOTAL HYSTERECTOMY N/A 11/17/2023    Procedure: HYSTERECTOMY, TOTAL, LAPAROSCOPIC; BILATERAL SALPINGECTOMY;  Surgeon: Clive Hernandez MD;  Location: [facility];  Service: OB/GYN;  Laterality: N/A;  RN PREOP 11/8/2023   T/S--done, UPT--NEG  ON 11/16/2023--CLEARED BY  CARDS    [facility]   2002    of cervix     Family History   Problem Relation Name Age of Onset    Diabetes Mother      Hypertension Mother      Bell's palsy Mother      Stroke Paternal Grandfather      Cervical cancer Sister      Breast cancer Maternal Grandmother      Breast cancer Maternal Aunt      Colon cancer Neg Hx      Ovarian cancer Neg Hx       Social History[1]    Review of Systems         Objective     Vitals:    04/09/25 0951   BP: 118/76   Pulse: 86   Temp: 98.3 °F (36.8 °C)   TempSrc: Oral   SpO2: 98%   Weight: 96 kg (211 lb 10.3 oz)   Height: 5' 6" (1.676 m)        Physical Exam  Constitutional:       General: She is not in acute distress.     Appearance: She is well-developed. She is not diaphoretic.   HENT:      Head: Normocephalic and atraumatic.   Eyes:      Conjunctiva/sclera: Conjunctivae normal.   Neck:      Vascular: No carotid bruit.   Cardiovascular:      Rate and Rhythm: Normal rate and regular rhythm.      Heart sounds: Normal " heart sounds. No murmur heard.     No friction rub. No gallop.   Pulmonary:      Effort: Pulmonary effort is normal. No respiratory distress.      Breath sounds: Normal breath sounds. No stridor. No wheezing, rhonchi or rales.   Musculoskeletal:      Cervical back: Normal range of motion and neck supple.      Right lower leg: No edema.      Left lower leg: No edema.   Neurological:      Mental Status: She is alert and oriented to person, place, and time.       Physical Exam                Assessment and Plan     1. Well woman exam  -     Ambulatory referral/consult to Obstetrics / Gynecology; Future; Expected date: 04/16/2025    2. Encounter for screening mammogram for breast cancer  -     Mammo Digital Screening Bilat; Future; Expected date: 04/09/2025  Due for mammogram.   3. Annual physical exam  -     CBC Auto Differential; Future; Expected date: 04/09/2025  -     Comprehensive Metabolic Panel; Future; Expected date: 04/09/2025  -     Lipid Panel; Future; Expected date: 10/09/2025  -     TSH; Future; Expected date: 04/09/2025  -     Hemoglobin A1C; Future; Expected date: 04/09/2025  Due for labs    4. Tobacco abuse  -     Ambulatory referral/consult to Smoking Cessation Program; Future; Expected date: 04/16/2025  Referral to smoking cessation.     5. Chronic pain of right ankle  -     Ambulatory referral/consult to Podiatry; Future; Expected date: 04/16/2025    6. Right foot pain  -     Ambulatory referral/consult to Podiatry; Future; Expected date: 04/16/2025  -     Ambulatory referral/consult to Podiatry; Future; Expected date: 04/16/2025  Referral to Dr. Timothy Allen or     Caty was seen today for referral and orders.    Diagnoses and all orders for this visit:    Well woman exam  -     Ambulatory referral/consult to Obstetrics / Gynecology; Future    Encounter for screening mammogram for breast cancer  -     Mammo Digital Screening Bilat; Future    Annual physical exam  -     CBC Auto Differential; Future  -      Comprehensive Metabolic Panel; Future  -     Lipid Panel; Future  -     TSH; Future  -     Hemoglobin A1C; Future    Tobacco abuse  -     Ambulatory referral/consult to Smoking Cessation Program; Future    Chronic pain of right ankle  -     Ambulatory referral/consult to Podiatry; Future    Right foot pain  -     Ambulatory referral/consult to Podiatry; Future  -     Ambulatory referral/consult to Podiatry; Future        Assessment & Plan                      No follow-ups on file.        This note was generated with the assistance of ambient listening technology. Verbal consent was obtained by the patient and accompanying visitor(s) for the recording of patient appointment to facilitate this note. I attest to having reviewed and edited the generated note for accuracy, though some syntax or spelling errors may persist. Please contact the author of this note for any clarification.         [1]   Social History  Socioeconomic History    Marital status: Single   Occupational History     Comment: works for [employer].    Tobacco Use    Smoking status: Every Day     Current packs/day: 0.50     Average packs/day: 0.4 packs/day for 29.3 years (10.6 ttl pk-yrs)     Types: Cigarettes     Start date: 2006    Smokeless tobacco: Never    Tobacco comments:     down to 2 cigarettes   Substance and Sexual Activity    Alcohol use: Yes     Comment: occassional    Drug use: No    Sexual activity: Yes     Partners: Male     Birth control/protection: None     Social Drivers of Health     Financial Resource Strain: Low Risk  (8/12/2024)    Overall Financial Resource Strain (CARDIA)     Difficulty of Paying Living Expenses: Not hard at all   Food Insecurity: No Food Insecurity (8/12/2024)    Hunger Vital Sign     Worried About Running Out of Food in the Last Year: Never true     Ran Out of Food in the Last Year: Never true   Physical Activity: Sufficiently Active (8/12/2024)    Exercise Vital Sign     Days of  Exercise per Week: 7 days     Minutes of Exercise per Session: 150+ min   Stress: No Stress Concern Present (8/12/2024)    [employer] - Occupational Stress Questionnaire     Feeling of Stress : Only a little   Housing Stability: Unknown (8/12/2024)    Housing Stability Vital Sign     Unable to Pay for Housing in the Last Year: No

## 2025-04-09 NOTE — PROGRESS NOTES
"Subjective     Patient ID: Caty Zapata is a 40 y.o. female.    Chief Complaint: Referral (Podiatrist ) and Orders (Pt requesting CT/MRI scan )    HPI    History of Present Illness               Review of Systems         Objective     Vitals:    04/09/25 0951   BP: 118/76   Pulse: 86   Temp: 98.3 °F (36.8 °C)   TempSrc: Oral   SpO2: 98%   Weight: 96 kg (211 lb 10.3 oz)   Height: 5' 6" (1.676 m)        Physical Exam  Physical Exam                Assessment and Plan     {There are no diagnoses linked to this encounter. (Refresh or delete this SmartLink)}  There are no diagnoses linked to this encounter.    Assessment & Plan                      No follow-ups on file.        This note was generated with the assistance of ambient listening technology. Verbal consent was obtained by the patient and accompanying visitor(s) for the recording of patient appointment to facilitate this note. I attest to having reviewed and edited the generated note for accuracy, though some syntax or spelling errors may persist. Please contact the author of this note for any clarification.    "

## 2025-04-10 ENCOUNTER — LAB VISIT (OUTPATIENT)
Dept: LAB | Facility: HOSPITAL | Age: 40
End: 2025-04-10
Attending: FAMILY MEDICINE
Payer: COMMERCIAL

## 2025-04-10 DIAGNOSIS — Z00.00 ANNUAL PHYSICAL EXAM: ICD-10-CM

## 2025-04-10 LAB
ABSOLUTE EOSINOPHIL (OHS): 0.09 K/UL
ABSOLUTE MONOCYTE (OHS): 0.68 K/UL (ref 0.3–1)
ABSOLUTE NEUTROPHIL COUNT (OHS): 0.87 K/UL (ref 1.8–7.7)
ALBUMIN SERPL BCP-MCNC: 3.6 G/DL (ref 3.5–5.2)
ALP SERPL-CCNC: 65 UNIT/L (ref 40–150)
ALT SERPL W/O P-5'-P-CCNC: 31 UNIT/L (ref 10–44)
ANION GAP (OHS): 6 MMOL/L (ref 8–16)
AST SERPL-CCNC: 29 UNIT/L (ref 11–45)
BASOPHILS # BLD AUTO: 0.02 K/UL
BASOPHILS NFR BLD AUTO: 0.6 %
BILIRUB SERPL-MCNC: 0.4 MG/DL (ref 0.1–1)
BUN SERPL-MCNC: 8 MG/DL (ref 6–20)
CALCIUM SERPL-MCNC: 8.6 MG/DL (ref 8.7–10.5)
CHLORIDE SERPL-SCNC: 105 MMOL/L (ref 95–110)
CHOLEST SERPL-MCNC: 191 MG/DL (ref 120–199)
CHOLEST/HDLC SERPL: 4.3 {RATIO} (ref 2–5)
CO2 SERPL-SCNC: 25 MMOL/L (ref 23–29)
CREAT SERPL-MCNC: 0.9 MG/DL (ref 0.5–1.4)
EAG (OHS): 105 MG/DL (ref 68–131)
ERYTHROCYTE [DISTWIDTH] IN BLOOD BY AUTOMATED COUNT: 14.4 % (ref 11.5–14.5)
GFR SERPLBLD CREATININE-BSD FMLA CKD-EPI: >60 ML/MIN/1.73/M2
GLUCOSE SERPL-MCNC: 98 MG/DL (ref 70–110)
HBA1C MFR BLD: 5.3 % (ref 4–5.6)
HCT VFR BLD AUTO: 42.7 % (ref 37–48.5)
HDLC SERPL-MCNC: 44 MG/DL (ref 40–75)
HDLC SERPL: 23 % (ref 20–50)
HGB BLD-MCNC: 13.6 GM/DL (ref 12–16)
IMM GRANULOCYTES # BLD AUTO: 0.01 K/UL (ref 0–0.04)
IMM GRANULOCYTES NFR BLD AUTO: 0.3 % (ref 0–0.5)
LDLC SERPL CALC-MCNC: 129.2 MG/DL (ref 63–159)
LYMPHOCYTES # BLD AUTO: 1.53 K/UL (ref 1–4.8)
MCH RBC QN AUTO: 28.5 PG (ref 27–31)
MCHC RBC AUTO-ENTMCNC: 31.9 G/DL (ref 32–36)
MCV RBC AUTO: 89 FL (ref 82–98)
NONHDLC SERPL-MCNC: 147 MG/DL
NUCLEATED RBC (/100WBC) (OHS): 0 /100 WBC
PLATELET # BLD AUTO: 310 K/UL (ref 150–450)
PMV BLD AUTO: 10.6 FL (ref 9.2–12.9)
POTASSIUM SERPL-SCNC: 4.1 MMOL/L (ref 3.5–5.1)
PROT SERPL-MCNC: 7.3 GM/DL (ref 6–8.4)
RBC # BLD AUTO: 4.78 M/UL (ref 4–5.4)
RELATIVE EOSINOPHIL (OHS): 2.8 %
RELATIVE LYMPHOCYTE (OHS): 47.8 % (ref 18–48)
RELATIVE MONOCYTE (OHS): 21.3 % (ref 4–15)
RELATIVE NEUTROPHIL (OHS): 27.2 % (ref 38–73)
SODIUM SERPL-SCNC: 136 MMOL/L (ref 136–145)
TRIGL SERPL-MCNC: 89 MG/DL (ref 30–150)
TSH SERPL-ACNC: 0.92 UIU/ML (ref 0.4–4)
WBC # BLD AUTO: 3.2 K/UL (ref 3.9–12.7)

## 2025-04-10 PROCEDURE — 84443 ASSAY THYROID STIM HORMONE: CPT

## 2025-04-10 PROCEDURE — 83036 HEMOGLOBIN GLYCOSYLATED A1C: CPT

## 2025-04-10 PROCEDURE — 85025 COMPLETE CBC W/AUTO DIFF WBC: CPT

## 2025-04-10 PROCEDURE — 36415 COLL VENOUS BLD VENIPUNCTURE: CPT | Mod: PO

## 2025-04-10 PROCEDURE — 80061 LIPID PANEL: CPT

## 2025-04-10 PROCEDURE — 80053 COMPREHEN METABOLIC PANEL: CPT

## 2025-04-11 ENCOUNTER — RESULTS FOLLOW-UP (OUTPATIENT)
Dept: FAMILY MEDICINE | Facility: CLINIC | Age: 40
End: 2025-04-11

## 2025-04-21 ENCOUNTER — OFFICE VISIT (OUTPATIENT)
Dept: PODIATRY | Facility: CLINIC | Age: 40
End: 2025-04-21
Payer: COMMERCIAL

## 2025-04-21 VITALS — HEIGHT: 66 IN | WEIGHT: 211.63 LBS | BODY MASS INDEX: 34.01 KG/M2

## 2025-04-21 DIAGNOSIS — M79.671 RIGHT FOOT PAIN: ICD-10-CM

## 2025-04-21 DIAGNOSIS — M24.573 EQUINUS CONTRACTURE OF ANKLE: ICD-10-CM

## 2025-04-21 DIAGNOSIS — M25.571 CHRONIC PAIN OF RIGHT ANKLE: ICD-10-CM

## 2025-04-21 DIAGNOSIS — M72.2 PLANTAR FASCIITIS: Primary | ICD-10-CM

## 2025-04-21 DIAGNOSIS — G89.29 CHRONIC PAIN OF RIGHT ANKLE: ICD-10-CM

## 2025-04-21 PROCEDURE — 99999 PR PBB SHADOW E&M-EST. PATIENT-LVL IV: CPT | Mod: PBBFAC,,, | Performed by: PODIATRIST

## 2025-04-21 RX ORDER — MELOXICAM 15 MG/1
15 TABLET ORAL DAILY
Qty: 30 TABLET | Refills: 0 | Status: SHIPPED | OUTPATIENT
Start: 2025-04-21

## 2025-04-21 RX ORDER — METHYLPREDNISOLONE 4 MG/1
TABLET ORAL
Qty: 1 EACH | Refills: 0 | Status: SHIPPED | OUTPATIENT
Start: 2025-04-21

## 2025-04-21 NOTE — PROGRESS NOTES
Subjective:      Patient ID: Caty Zaapta is a 40 y.o. female.    Chief Complaint: Foot Pain (R foot)    Sharp deep pain bottom right heel.  Chronic condition greater than one year.  This exacerbation is Gradual onset, worsening over past several weeks, aggravated by increased weight bearing, shoe gear, pressure.  Nsaids, injection, fx boot, otc inserts minimal help.  OTC pain med not helping. Xrays nwb right negative acute injury Feb 2025    Review of Systems   Constitutional: Negative for chills, diaphoresis, fever, malaise/fatigue and night sweats.   Cardiovascular:  Negative for claudication, cyanosis, leg swelling and syncope.   Skin:  Negative for color change, dry skin, nail changes, rash, suspicious lesions and unusual hair distribution.   Musculoskeletal:  Negative for falls, joint pain, joint swelling, muscle cramps, muscle weakness and stiffness.   Gastrointestinal:  Negative for constipation, diarrhea, nausea and vomiting.   Neurological:  Negative for brief paralysis, disturbances in coordination, focal weakness, numbness, paresthesias, sensory change and tremors.           Objective:      Physical Exam  Musculoskeletal:      Comments: Sharp deep pain to palpation inferior heel right at medial calcaneal tubercle without ecchymosis, erythema, edema, or cardinal signs infection, and no signs of trauma.    Ankle dorsiflexion decreased at <10 degrees bilateral with moderate increase with knee flexion bilateral.    Otherwise, Normal angle, base, station of gait. All ten toes without clubbing, cyanosis, or signs of ischemia.  No pain to palpation bilateral lower extremities.  Range of motion, stability, muscle strength, and muscle tone normal bilateral feet and legs.    Skin:     Comments: Skin is normal age and health appropriate color, turgor, texture, and temperature bilateral lower extremities without ulceration, hyperpigmentation, discoloration, masses nodules or cords palpated.  No ecchymosis, erythema,  edema, or cardinal signs of infection bilateral lower extremities.    Neurological:      Comments: Negative tinel sign to percussion sural, superficial peroneal, deep peroneal, saphenous, and posterior tibial nerves right and left ankles and feet.    Negative allodynia both feet               Assessment:       Encounter Diagnoses   Name Primary?    Chronic pain of right ankle     Right foot pain          Plan:       Caty was seen today for foot pain.    Diagnoses and all orders for this visit:    Chronic pain of right ankle  -     Ambulatory referral/consult to Podiatry    Right foot pain  -     Ambulatory referral/consult to Podiatry      I counseled the patient on her conditions, their implications and medical management.    I applied a plantar rest strapping to the patient's right foot to offload symptomatic area, support the arch, and relieve pain.     Meloxicam, medrol dose pack, night splint, ankle xray right, PT, custom orthotics    Continue/implement stretches, inserts, athletic shoes, activity to tolerance.    Declines injection pending improvement.          No follow-ups on file.

## 2025-06-23 ENCOUNTER — CLINICAL SUPPORT (OUTPATIENT)
Dept: REHABILITATION | Facility: HOSPITAL | Age: 40
End: 2025-06-23
Attending: PODIATRIST
Payer: COMMERCIAL

## 2025-06-23 DIAGNOSIS — M24.573 EQUINUS CONTRACTURE OF ANKLE: ICD-10-CM

## 2025-06-23 DIAGNOSIS — R29.898 WEAKNESS OF RIGHT LOWER EXTREMITY: ICD-10-CM

## 2025-06-23 DIAGNOSIS — M72.2 PLANTAR FASCIITIS: ICD-10-CM

## 2025-06-23 DIAGNOSIS — M79.671 RIGHT FOOT PAIN: ICD-10-CM

## 2025-06-23 DIAGNOSIS — M25.671 DECREASED RANGE OF MOTION OF RIGHT ANKLE: Primary | ICD-10-CM

## 2025-06-23 PROCEDURE — 97161 PT EVAL LOW COMPLEX 20 MIN: CPT | Mod: PN

## 2025-06-23 PROCEDURE — 97110 THERAPEUTIC EXERCISES: CPT | Mod: PN

## 2025-06-23 NOTE — PROGRESS NOTES
Outpatient Rehab    Physical Therapy Evaluation    Patient Name: Caty Zapata  MRN: [id number]  YOB: 1985  Encounter Date: 6/23/2025    Therapy Diagnosis:   Encounter Diagnoses   Name Primary?    Right foot pain     Plantar fasciitis     Equinus contracture of ankle     Decreased range of motion of right ankle Yes    Weakness of right lower extremity      Physician: Timothy Allen DPM    Physician Orders: Eval and Treat  Medical Diagnosis: Right foot pain  Plantar fasciitis  Equinus contracture of ankle  Surgical Diagnosis: Not applicable for this Episode   Surgical Date: Not applicable for this Episode  Days Since Last Surgery: Not applicable for this Episode    Visit # / Visits Authorized:  1 / 1  Insurance Authorization Period: 4/21/2025 to 4/21/2026  Date of Evaluation: 6/23/2025  Plan of Care Certification: 6/23/2025 to 9/15/2025      Time In: 0710   Time Out: 0800  Total Time (in minutes): 50   Total Billable Time (in minutes): 45    Intake Outcome Measure for FOTO Survey    Therapist reviewed FOTO scores for Caty Zapata on 6/23/2025.   FOTO report - see Media section or FOTO account episode details.     Intake Score: 44%    Precautions:       Subjective   History of Present Illness  Caty is a 40 y.o. female who reports to physical therapy with a chief concern of Right foot pain.     The patient reports a medical diagnosis of M79.671 (ICD-10-CM) - Right foot pain  M72.2 (ICD-10-CM) - Plantar fasciitis  M24.573 (ICD-10-CM) - Equinus contracture of ankle. The patient has experienced this issue since 04/21/25.   Diagnostic tests related to this condition: X-ray.   X-Ray Details: No fracture or dislocation.     Lisfranc articulation is congruent.     Cartilage spaces are maintained.     Mild Achilles enthesopathy.     Soft tissues are unremarkable.    History of Present Condition/Illness: Patient is a 40 year old female who presents to PT with a sharp pain on the plantar aspect of the right heel  that recently radiated into the entire right foot. Pain recently increased 6 months ago with a sudden onset and RUPINDER. Previous interventions include a steroid injection; which provided minimal relief in pain. She does report intermittent N/T and RLE swelling on the foot/ankle.    Activities of Daily Living  Previously independent with activities of daily living? Yes     Currently independent with activities of daily living? Yes          Previously independent with instrumental activities of daily living? Yes     Currently independent with instrumental activities of daily living? Yes              Pain     Patient reports a current pain level of 6/10. Pain at best is reported as 5/10. Pain at worst is reported as 10/10.   Location: Right foot  Clinical Progression (since onset): Stable  Pain Qualities: Aching, Sharp, Throbbing  Pain-Relieving Factors: Other (Comment)  Other Pain-Relieving Factors: Unable to identify  Pain-Aggravating Factors: Walking, Standing         Living Arrangements  Living Situation  Housing: Home independently  Living Arrangements: Other (Comment)  Other Living Arrangements Comment: Adult daughter    Home Setup  Type of Structure: Mobile home  Home Access: Stairs with rails  Entrance Stairs - Number of Steps: 6  Entrance Stairs - Rails: Both        Employment  Employment Status: Employed full-time   , Standing for 13 hours, 5x week      Past Medical History/Physical Systems Review:   Caty Zapata  has a past medical history of Abnormal Pap smear, Cervical cancer, and Vaginal delivery.    Caty Zapata  has a past surgical history that includes LEEP  (2002); Knee surgery (2015); and Laparoscopic total hysterectomy (N/A, 11/17/2023).    Caty has a current medication list which includes the following prescription(s): acetaminophen, docusate sodium, gabapentin, ibuprofen, linaclotide, meloxicam, and methylprednisolone.    Review of patient's allergies indicates:  No Known Allergies      Objective   Posture                 Bilateral ankles/feet exhibit: Foot Pes Planus       Lower Extremity Sensation  General Lumbar/Lower Extremity Sensation  Impaired: Left                 Right Dermatomes  Right Lumbar Dermatome Light Touch  Intact: L2 and L3  Hypersensitive: L4 and L5  Right Sacral Dermatome Light Touch  Hypersensitive: S1-2       Left Dermatomes  Left Lumbar Dermatome Light Touch  Intact: L2 and L3  Hypersensitive: L4 and L5  Left Sacral Dermatome Light Touch  Hypersensitive: S1-2           Pulses  Ankle/Foot Pulses  Right Dorsalis Pedis Pulse: Moderate  Left Dorsalis Pedis Pulse: Moderate         Ankle/Foot Swelling  Location of Measurement Right  (cm) Left  (cm)   Metatarsal Head       Figure 8       Malleolus 25 25            Ankle/Foot Palpation     Hypersensitive entire foot                 Ankle/Foot Range of Motion   Right Ankle/Foot   Active (deg) Passive (deg) Pain   Dorsiflexion (KE) -10 0 Yes   Dorsiflexion (KF) 0 10 Yes   Plantar Flexion 40       Ankle Inversion 18       Ankle Eversion 0 2     Subtalar Inversion         Subtalar Eversion         Great Toe MTP Flexion         Great Toe MTP Extension         Great Toe IP Flexion             Left Ankle/Foot   Active (deg) Passive (deg) Pain   Dorsiflexion (KE) 12       Dorsiflexion (KF) 20       Plantar Flexion 56       Ankle Inversion 30       Ankle Eversion 14       Subtalar Inversion         Subtalar Eversion         Great Toe MTP Flexion         Great Toe MTP Extension         Great Toe IP Flexion                            Knee Strength   Right Strength Right Pain Left Strength Left  Pain   Flexion (S2) 3+ Yes 4     Prone Flexion           Extension (L3) 4   4            Ankle/Foot Strength - Planes of Motion   Right Strength Right Pain Left Strength Left  Pain   Dorsiflexion (L4) 3+ Yes 5     Plantar Flexion (S1) 4 Yes 5     Inversion 3+ Yes 5     Eversion 3+ Yes 5     Great Toe Flexion           Great Toe Extension (L5) 4 Yes  "5     Lesser Toes Flexion           Lesser Toes Extension                  Ankle/Foot Special Tests  Ankle/Foot Neurological Tests  Negative: Right Tinel's Sign (Tarsal Tunnel) and Left Tinel's Sign (Tarsal Tunnel)  Achilles Tests  Positive: Right Royal Gonsales  Negative: Left Royal Gonsales, Right Bahena, and Left Bahena  Other Ankle/Foot Tests  Positive: Right Windlass  Negative: Left Windlass           Ankle/Foot Joint Mobility  Right Ankle/Foot Joint Mobility  Hypomobile: Talocrural Joint  Left Ankle/Foot Joint Mobility  Normal: Talocrural Joint              Ambulation Assistance Required  Surface With  Assistive Device Without Assistive Device Details   Level   Supervision      Uneven   Contact guard assist     Curb           Ambulation Details  Ambulation distance was 100 meters.      Gait Analysis  Base of Support: Normal  Gait Pattern: Antalgic  Walking Speed: Decreased    Right Side Walking Observations  Decreased: Stance Time  Right Foot Contact Pattern: Heel to toe    Left Side Walking Observations  Decreased: Swing Time and Step Length  Left Foot Contact Pattern: Flat foot         Treatment:  Therapeutic Exercise  TE 1: PF/soleus stretch 3x30"  TE 2: RTB ankle 4 way x10  TE 3: Ankle ABCs x1  TE 4: PF isometric 5x45"  TE 5: HEP edu      Time Entry(in minutes):  PT Evaluation (Low) Time Entry: 30  Therapeutic Exercise Time Entry: 15    Assessment & Plan   Assessment  Caty presents with a condition of Low complexity.   Presentation of Symptoms: Stable       Functional Limitations: Activity tolerance, Completing work/school activities, Decreased ambulation distance/endurance, Functional mobility, Gait limitations, Pain with ADLs/IADLs, Painful locomotion/ambulation, Participating in leisure activities, Range of motion, Squatting  Impairments: Abnormal or restricted range of motion, Activity intolerance, Impaired physical strength  Personal Factors Affecting Prognosis: Pain    Patient Goal for Therapy (PT): " Patient wants to decrease her pain  Prognosis: Good  Assessment Details: Patient is a 40 year old female who presents to PT with signs and symptoms consistent with referring diagnosis. They present with decreased R ankle range of motion, impaired RLE strength, poor ankle motor control and proprioception, and increased pain. They are functionally limited in ambulating, stair climbing, and squatting. Self rated disability is considered severe with a LEFS intake score of 32/80. The evaluation is considered low complexity secondary to co-morbid conditions and stable nature of the identified medical condition. They were educated on plan of care and consented to treatment. No precautions or contraindications to therapy were identified. They will benefit from skilled PT intervention to address functional deficits identified and return to prior level of function.      Plan  From a physical therapy perspective, the patient would benefit from: Skilled Rehab Services    Planned therapy interventions include: Therapeutic exercise, Therapeutic activities, Neuromuscular re-education, Manual therapy, and Gait training.    Planned modalities to include: Biofeedback, Cryotherapy (cold pack), Electrical stimulation - attended, Electrical stimulation - passive/unattended, Thermotherapy (hot pack), and Other (Comment). Dry needling      Visit Frequency: 2 times Per Week for 12 Weeks.       This plan was discussed with Patient.   Discussion participants: Agreed Upon Plan of Care             The patient's spiritual, cultural, and educational needs were considered, and the patient is agreeable to the plan of care and goals.     Education  Education was done with Patient. The patient's learning style includes Demonstration, Listening, and Pictures/video. The patient Demonstrates understanding and Verbalizes understanding.         Patient educated on POC, goals, HEP, and consents to treatment       Goals:   Active       1. Short term goals          Patient will be independent in HEP to improve ankle ROM and lower extremity strength needed for functional activities.            Start:  06/23/25    Expected End:  08/04/25            Patient will report pain at its worst to 3/10 in order to improve functional mobility            Start:  06/23/25    Expected End:  08/04/25            Patient will improve lower quarter strength by 1/5 grade in order to safely perform functional activities         Start:  06/23/25    Expected End:  08/04/25               2. Long term goals        Patient will improve their FOTO score to 64 or greater indicating clinically significant improvement in function         Start:  06/23/25    Expected End:  09/15/25            Patient will improve pain at it's worst to </= 2/10 to improve overall quality of life and functional abilities          Start:  06/23/25    Expected End:  09/15/25            Patient will be able to ambulate 10 minutes pain free in order to safely perform occupational duties       Start:  06/23/25    Expected End:  09/15/25                Rafita Piper, PT

## 2025-07-01 DIAGNOSIS — K59.00 CONSTIPATION, UNSPECIFIED CONSTIPATION TYPE: ICD-10-CM

## 2025-07-01 NOTE — TELEPHONE ENCOUNTER
Refill Routing Note   Medication(s) are not appropriate for processing by [facility] for the following reason(s):        Outside of protocol    [facility] action(s):  Route               Appointments  past 12m or future 3m with PCP    Date Provider   Last Visit   4/9/2025 Gisella Sam MD   Next Visit   Visit date not found Gisella Sam MD   ED visits in past 90 days: 0        Note composed:3:11 PM 07/01/2025

## 2025-07-01 NOTE — TELEPHONE ENCOUNTER
No care due was identified.  [facility] Embedded Care Due Messages. Reference number: [id number].   7/01/2025 2:50:58 PM CDT

## 2025-07-02 RX ORDER — LINACLOTIDE 72 UG/1
72 CAPSULE, GELATIN COATED ORAL
Qty: 90 CAPSULE | Refills: 1 | Status: SHIPPED | OUTPATIENT
Start: 2025-07-02

## 2025-08-05 ENCOUNTER — HOSPITAL ENCOUNTER (OUTPATIENT)
Dept: RADIOLOGY | Facility: HOSPITAL | Age: 40
Discharge: HOME | End: 2025-08-05
Attending: FAMILY MEDICINE
Payer: COMMERCIAL

## 2025-08-05 DIAGNOSIS — Z12.31 ENCOUNTER FOR SCREENING MAMMOGRAM FOR BREAST CANCER: ICD-10-CM

## 2025-08-05 PROCEDURE — 77067 SCR MAMMO BI INCL CAD: CPT | Mod: TC

## (undated) DEVICE — SYR 10CC LUER LOCK

## (undated) DEVICE — SEE MEDLINE ITEM 157110

## (undated) DEVICE — SOL CLEARIFY VISUALIZATION LAP

## (undated) DEVICE — SUT VICRYL PLUS 0 CT1 36IN

## (undated) DEVICE — KIT ANTIFOG

## (undated) DEVICE — Device

## (undated) DEVICE — APPLICATOR CHLORAPREP ORN 26ML

## (undated) DEVICE — GLOVE SIGNATURE ESSNTL LTX 7.5

## (undated) DEVICE — NDL INSUF ULTRA VERESS 120MM

## (undated) DEVICE — OCCLUDER COLPO-PNEUMO STERILE

## (undated) DEVICE — SEE MEDLINE ITEM 146292

## (undated) DEVICE — PACK LAPAROSCOPY/PELVISCOPY II

## (undated) DEVICE — UNDERGLOVES BIOGEL PI SIZE 7.5

## (undated) DEVICE — ELECTRODE REM PLYHSV RETURN 9

## (undated) DEVICE — SEE MEDLINE ITEM 154981

## (undated) DEVICE — SUT VICRYL PLUS 4-0 P3 18IN

## (undated) DEVICE — PAD SANITARY OB STERILE

## (undated) DEVICE — PAD PREP 50/CA

## (undated) DEVICE — BLADE SURG CARBON STEEL SZ11

## (undated) DEVICE — SOL NS 1000CC

## (undated) DEVICE — ENDOSTITCH INSTRUMENT

## (undated) DEVICE — UNDERGLOVES BIOGEL PI SZ 6 LF

## (undated) DEVICE — COVER OVERHEAD SURG LT BLUE

## (undated) DEVICE — TRAY FOLEY 16FR INFECTION CONT

## (undated) DEVICE — TROCAR ENDOPATH XCEL 5X100MM

## (undated) DEVICE — TIP RUMI BLUE DISPOSABLE 5/BX

## (undated) DEVICE — DISSECTOR CURVED 5DCD

## (undated) DEVICE — DEVICE N-SEAL LAPROSCOPIC

## (undated) DEVICE — IRRIGATOR ENDOSCOPY DISP.

## (undated) DEVICE — CANISTER SUCTION 2 LTR

## (undated) DEVICE — GLOVE SURG BIOGEL LATEX SZ 7.5

## (undated) DEVICE — SOL NACL IRR 1000ML BTL

## (undated) DEVICE — TOWEL OR DISP STRL BLUE 4/PK

## (undated) DEVICE — DRAPE STERI LONG

## (undated) DEVICE — TUBING INSUFFLATION 10

## (undated) DEVICE — BLANKET UPPER BODY 78.7X29.9IN

## (undated) DEVICE — TROCAR ENDOPATH XCEL 11MM 10CM

## (undated) DEVICE — DRESSING ADH ISLAND 2.5 X 3

## (undated) DEVICE — BOWL STERILE LARGE 32OZ

## (undated) DEVICE — SYR 50CC LL

## (undated) DEVICE — MAT QUICK 40X30 FLOOR FLUID LF